# Patient Record
Sex: MALE | Race: WHITE | Employment: OTHER | URBAN - METROPOLITAN AREA
[De-identification: names, ages, dates, MRNs, and addresses within clinical notes are randomized per-mention and may not be internally consistent; named-entity substitution may affect disease eponyms.]

---

## 2017-01-01 ENCOUNTER — ANESTHESIA (OUTPATIENT)
Dept: SURGERY | Age: 77
End: 2017-01-01
Payer: MEDICARE

## 2017-01-01 ENCOUNTER — HOSPICE ADMISSION (OUTPATIENT)
Dept: HOSPICE | Facility: HOSPICE | Age: 77
End: 2017-01-01
Payer: MEDICARE

## 2017-01-01 ENCOUNTER — HOSPITAL ENCOUNTER (INPATIENT)
Age: 77
LOS: 4 days | End: 2017-11-19
Attending: INTERNAL MEDICINE | Admitting: INTERNAL MEDICINE

## 2017-01-01 ENCOUNTER — HOSPITAL ENCOUNTER (OUTPATIENT)
Dept: SURGERY | Age: 77
Discharge: HOME | End: 2017-02-06
Payer: MEDICARE

## 2017-01-01 ENCOUNTER — SURGERY (OUTPATIENT)
Age: 77
End: 2017-01-01

## 2017-01-01 ENCOUNTER — PATIENT OUTREACH (OUTPATIENT)
Dept: CASE MANAGEMENT | Age: 77
End: 2017-01-01

## 2017-01-01 ENCOUNTER — HOSPITAL ENCOUNTER (OUTPATIENT)
Age: 77
Setting detail: OBSERVATION
Discharge: HOME | End: 2017-02-15
Attending: UROLOGY | Admitting: UROLOGY
Payer: MEDICARE

## 2017-01-01 ENCOUNTER — ANESTHESIA EVENT (OUTPATIENT)
Dept: SURGERY | Age: 77
End: 2017-01-01
Payer: MEDICARE

## 2017-01-01 VITALS
HEART RATE: 83 BPM | TEMPERATURE: 97.8 F | HEIGHT: 66 IN | SYSTOLIC BLOOD PRESSURE: 83 MMHG | BODY MASS INDEX: 19.61 KG/M2 | DIASTOLIC BLOOD PRESSURE: 51 MMHG | RESPIRATION RATE: 17 BRPM | WEIGHT: 122 LBS | OXYGEN SATURATION: 96 %

## 2017-01-01 VITALS
HEART RATE: 78 BPM | TEMPERATURE: 97.7 F | WEIGHT: 125.31 LBS | HEIGHT: 66 IN | OXYGEN SATURATION: 95 % | SYSTOLIC BLOOD PRESSURE: 111 MMHG | BODY MASS INDEX: 20.14 KG/M2 | RESPIRATION RATE: 20 BRPM | DIASTOLIC BLOOD PRESSURE: 59 MMHG

## 2017-01-01 VITALS
SYSTOLIC BLOOD PRESSURE: 100 MMHG | RESPIRATION RATE: 15 BRPM | DIASTOLIC BLOOD PRESSURE: 58 MMHG | HEART RATE: 87 BPM | TEMPERATURE: 97.1 F

## 2017-01-01 DIAGNOSIS — R65.10 SYSTEMIC INFLAMMATORY RESPONSE SYNDROME (HCC): ICD-10-CM

## 2017-01-01 DIAGNOSIS — C83.00 SMALL B-CELL LYMPHOMA, UNSPECIFIED BODY REGION (HCC): ICD-10-CM

## 2017-01-01 DIAGNOSIS — C91.10 CLL (CHRONIC LYMPHOCYTIC LEUKEMIA) (HCC): Chronic | ICD-10-CM

## 2017-01-01 LAB
ABO + RH BLD: NORMAL
ANION GAP BLD CALC-SCNC: 8 MMOL/L (ref 7–16)
APPEARANCE UR: ABNORMAL
BACTERIA URNS QL MICRO: ABNORMAL /HPF
BILIRUB UR QL: NEGATIVE
BLOOD GROUP ANTIBODIES SERPL: NORMAL
BUN SERPL-MCNC: 20 MG/DL (ref 8–23)
CALCIUM SERPL-MCNC: 8.1 MG/DL (ref 8.3–10.4)
CASTS URNS QL MICRO: 0 /LPF
CHLORIDE SERPL-SCNC: 103 MMOL/L (ref 98–107)
CO2 SERPL-SCNC: 29 MMOL/L (ref 21–32)
COLOR UR: YELLOW
CREAT SERPL-MCNC: 1.24 MG/DL (ref 0.8–1.5)
CRYSTALS URNS QL MICRO: 0 /LPF
EPI CELLS #/AREA URNS HPF: ABNORMAL /HPF
ERYTHROCYTE [DISTWIDTH] IN BLOOD BY AUTOMATED COUNT: 14.6 % (ref 11.9–14.6)
GLUCOSE SERPL-MCNC: 110 MG/DL (ref 65–100)
GLUCOSE UR STRIP.AUTO-MCNC: NEGATIVE MG/DL
HCT VFR BLD AUTO: 37.3 % (ref 41.1–50.3)
HGB BLD-MCNC: 11.5 G/DL (ref 13.6–17.2)
HGB UR QL STRIP: ABNORMAL
KETONES UR QL STRIP.AUTO: NEGATIVE MG/DL
LEUKOCYTE ESTERASE UR QL STRIP.AUTO: ABNORMAL
MCH RBC QN AUTO: 27.6 PG (ref 26.1–32.9)
MCHC RBC AUTO-ENTMCNC: 30.8 G/DL (ref 31.4–35)
MCV RBC AUTO: 89.4 FL (ref 79.6–97.8)
MUCOUS THREADS URNS QL MICRO: 0 /LPF
NITRITE UR QL STRIP.AUTO: POSITIVE
OTHER OBSERVATIONS,UCOM: ABNORMAL
PH UR STRIP: 6 [PH] (ref 5–9)
PLATELET # BLD AUTO: 111 K/UL (ref 150–450)
PMV BLD AUTO: 9.7 FL (ref 10.8–14.1)
POTASSIUM SERPL-SCNC: 4.9 MMOL/L (ref 3.5–5.1)
PROT UR STRIP-MCNC: NEGATIVE MG/DL
RBC # BLD AUTO: 4.17 M/UL (ref 4.23–5.67)
RBC #/AREA URNS HPF: ABNORMAL /HPF
SODIUM SERPL-SCNC: 140 MMOL/L (ref 136–145)
SP GR UR REFRACTOMETRY: 1.01 (ref 1–1.02)
SPECIMEN EXP DATE BLD: NORMAL
UROBILINOGEN UR QL STRIP.AUTO: 0.2 EU/DL (ref 0.2–1)
WBC # BLD AUTO: 18 K/UL (ref 4.3–11.1)
WBC URNS QL MICRO: ABNORMAL /HPF

## 2017-01-01 PROCEDURE — 74011000258 HC RX REV CODE- 258: Performed by: UROLOGY

## 2017-01-01 PROCEDURE — 76010000149 HC OR TIME 1 TO 1.5 HR: Performed by: UROLOGY

## 2017-01-01 PROCEDURE — 74011250636 HC RX REV CODE- 250/636: Performed by: INTERNAL MEDICINE

## 2017-01-01 PROCEDURE — 74011250637 HC RX REV CODE- 250/637: Performed by: INTERNAL MEDICINE

## 2017-01-01 PROCEDURE — 77030018836 HC SOL IRR NACL ICUM -A

## 2017-01-01 PROCEDURE — 99231 SBSQ HOSP IP/OBS SF/LOW 25: CPT | Performed by: INTERNAL MEDICINE

## 2017-01-01 PROCEDURE — 94760 N-INVAS EAR/PLS OXIMETRY 1: CPT

## 2017-01-01 PROCEDURE — 74011000250 HC RX REV CODE- 250

## 2017-01-01 PROCEDURE — 97161 PT EVAL LOW COMPLEX 20 MIN: CPT

## 2017-01-01 PROCEDURE — 74011000250 HC RX REV CODE- 250: Performed by: NURSE PRACTITIONER

## 2017-01-01 PROCEDURE — 74011250637 HC RX REV CODE- 250/637: Performed by: UROLOGY

## 2017-01-01 PROCEDURE — 99218 HC RM OBSERVATION: CPT

## 2017-01-01 PROCEDURE — 81015 MICROSCOPIC EXAM OF URINE: CPT | Performed by: UROLOGY

## 2017-01-01 PROCEDURE — 77030032490 HC SLV COMPR SCD KNE COVD -B: Performed by: UROLOGY

## 2017-01-01 PROCEDURE — 77030020782 HC GWN BAIR PAWS FLX 3M -B: Performed by: NURSE ANESTHETIST, CERTIFIED REGISTERED

## 2017-01-01 PROCEDURE — 0656 HSPC GENERAL INPATIENT

## 2017-01-01 PROCEDURE — 77030010545: Performed by: UROLOGY

## 2017-01-01 PROCEDURE — 74011250636 HC RX REV CODE- 250/636: Performed by: NURSE PRACTITIONER

## 2017-01-01 PROCEDURE — 94640 AIRWAY INHALATION TREATMENT: CPT

## 2017-01-01 PROCEDURE — 77030019927 HC TBNG IRR CYSTO BAXT -A: Performed by: UROLOGY

## 2017-01-01 PROCEDURE — 77030005206: Performed by: UROLOGY

## 2017-01-01 PROCEDURE — 74011000250 HC RX REV CODE- 250: Performed by: INTERNAL MEDICINE

## 2017-01-01 PROCEDURE — G8980 MOBILITY D/C STATUS: HCPCS

## 2017-01-01 PROCEDURE — 74011000250 HC RX REV CODE- 250: Performed by: UROLOGY

## 2017-01-01 PROCEDURE — 77030018830 HC SOL IRR GLYC ICUM-A: Performed by: UROLOGY

## 2017-01-01 PROCEDURE — 74011250636 HC RX REV CODE- 250/636

## 2017-01-01 PROCEDURE — 74011250636 HC RX REV CODE- 250/636: Performed by: UROLOGY

## 2017-01-01 PROCEDURE — 86900 BLOOD TYPING SEROLOGIC ABO: CPT | Performed by: ANESTHESIOLOGY

## 2017-01-01 PROCEDURE — 77010033678 HC OXYGEN DAILY

## 2017-01-01 PROCEDURE — 88305 TISSUE EXAM BY PATHOLOGIST: CPT | Performed by: UROLOGY

## 2017-01-01 PROCEDURE — G8979 MOBILITY GOAL STATUS: HCPCS

## 2017-01-01 PROCEDURE — 99222 1ST HOSP IP/OBS MODERATE 55: CPT | Performed by: INTERNAL MEDICINE

## 2017-01-01 PROCEDURE — 74011250636 HC RX REV CODE- 250/636: Performed by: ANESTHESIOLOGY

## 2017-01-01 PROCEDURE — 85027 COMPLETE CBC AUTOMATED: CPT | Performed by: UROLOGY

## 2017-01-01 PROCEDURE — 36415 COLL VENOUS BLD VENIPUNCTURE: CPT | Performed by: UROLOGY

## 2017-01-01 PROCEDURE — 82355 CALCULUS ANALYSIS QUAL: CPT | Performed by: UROLOGY

## 2017-01-01 PROCEDURE — 74011250636 HC RX REV CODE- 250/636: Performed by: HOSPITALIST

## 2017-01-01 PROCEDURE — 77030018846 HC SOL IRR STRL H20 ICUM -A: Performed by: UROLOGY

## 2017-01-01 PROCEDURE — 77030007880 HC KT SPN EPDRL BBMI -B: Performed by: NURSE ANESTHETIST, CERTIFIED REGISTERED

## 2017-01-01 PROCEDURE — 3336500001 HSPC ELECTION

## 2017-01-01 PROCEDURE — 76060000033 HC ANESTHESIA 1 TO 1.5 HR: Performed by: UROLOGY

## 2017-01-01 PROCEDURE — 81001 URINALYSIS AUTO W/SCOPE: CPT | Performed by: UROLOGY

## 2017-01-01 PROCEDURE — 77030011640 HC PAD GRND REM COVD -A: Performed by: UROLOGY

## 2017-01-01 PROCEDURE — G8978 MOBILITY CURRENT STATUS: HCPCS

## 2017-01-01 PROCEDURE — 80048 BASIC METABOLIC PNL TOTAL CA: CPT | Performed by: UROLOGY

## 2017-01-01 PROCEDURE — 76210000006 HC OR PH I REC 0.5 TO 1 HR: Performed by: UROLOGY

## 2017-01-01 PROCEDURE — 77030003665 HC NDL SPN BBMI -A: Performed by: NURSE ANESTHETIST, CERTIFIED REGISTERED

## 2017-01-01 PROCEDURE — 77030005546 HC CATH URETH FOL 3W BARD -A: Performed by: UROLOGY

## 2017-01-01 RX ORDER — PANTOPRAZOLE SODIUM 40 MG/1
40 TABLET, DELAYED RELEASE ORAL
Status: DISCONTINUED | OUTPATIENT
Start: 2017-01-01 | End: 2017-01-01 | Stop reason: HOSPADM

## 2017-01-01 RX ORDER — SODIUM CHLORIDE 0.9 % (FLUSH) 0.9 %
10 SYRINGE (ML) INJECTION AS NEEDED
Status: DISCONTINUED | OUTPATIENT
Start: 2017-01-01 | End: 2017-01-01 | Stop reason: HOSPADM

## 2017-01-01 RX ORDER — SODIUM CHLORIDE, SODIUM LACTATE, POTASSIUM CHLORIDE, CALCIUM CHLORIDE 600; 310; 30; 20 MG/100ML; MG/100ML; MG/100ML; MG/100ML
75 INJECTION, SOLUTION INTRAVENOUS CONTINUOUS
Status: DISCONTINUED | OUTPATIENT
Start: 2017-01-01 | End: 2017-01-01

## 2017-01-01 RX ORDER — FACIAL-BODY WIPES
10 EACH TOPICAL AS NEEDED
Status: DISCONTINUED | OUTPATIENT
Start: 2017-01-01 | End: 2017-01-01 | Stop reason: HOSPADM

## 2017-01-01 RX ORDER — OXYCODONE HYDROCHLORIDE 5 MG/1
10 TABLET ORAL
Status: DISCONTINUED | OUTPATIENT
Start: 2017-01-01 | End: 2017-01-01

## 2017-01-01 RX ORDER — NITROGLYCERIN 0.4 MG/1
0.4 TABLET SUBLINGUAL
Status: DISCONTINUED | OUTPATIENT
Start: 2017-01-01 | End: 2017-01-01 | Stop reason: HOSPADM

## 2017-01-01 RX ORDER — NALOXONE HYDROCHLORIDE 0.4 MG/ML
0.4 INJECTION, SOLUTION INTRAMUSCULAR; INTRAVENOUS; SUBCUTANEOUS AS NEEDED
Status: DISCONTINUED | OUTPATIENT
Start: 2017-01-01 | End: 2017-01-01 | Stop reason: HOSPADM

## 2017-01-01 RX ORDER — GLYCOPYRROLATE 0.2 MG/ML
0.2 INJECTION INTRAMUSCULAR; INTRAVENOUS
Status: DISCONTINUED | OUTPATIENT
Start: 2017-01-01 | End: 2017-01-01

## 2017-01-01 RX ORDER — MORPHINE SULFATE 4 MG/ML
4 INJECTION, SOLUTION INTRAMUSCULAR; INTRAVENOUS
Status: DISCONTINUED | OUTPATIENT
Start: 2017-01-01 | End: 2017-01-01 | Stop reason: HOSPADM

## 2017-01-01 RX ORDER — LORAZEPAM 2 MG/ML
2 INJECTION INTRAMUSCULAR
Status: DISCONTINUED | OUTPATIENT
Start: 2017-01-01 | End: 2017-01-01 | Stop reason: HOSPADM

## 2017-01-01 RX ORDER — OXYBUTYNIN CHLORIDE 5 MG/1
5 TABLET ORAL
Status: DISCONTINUED | OUTPATIENT
Start: 2017-01-01 | End: 2017-01-01 | Stop reason: HOSPADM

## 2017-01-01 RX ORDER — LORATADINE 10 MG/1
10 TABLET ORAL DAILY
Status: DISCONTINUED | OUTPATIENT
Start: 2017-01-01 | End: 2017-01-01 | Stop reason: HOSPADM

## 2017-01-01 RX ORDER — GLYCOPYRROLATE 0.2 MG/ML
0.2 INJECTION INTRAMUSCULAR; INTRAVENOUS
Status: DISCONTINUED | OUTPATIENT
Start: 2017-01-01 | End: 2017-01-01 | Stop reason: HOSPADM

## 2017-01-01 RX ORDER — ACETAMINOPHEN 325 MG/1
650 TABLET ORAL
Status: DISCONTINUED | OUTPATIENT
Start: 2017-01-01 | End: 2017-01-01

## 2017-01-01 RX ORDER — HALOPERIDOL 5 MG/ML
2 INJECTION INTRAMUSCULAR
Status: DISCONTINUED | OUTPATIENT
Start: 2017-01-01 | End: 2017-01-01 | Stop reason: HOSPADM

## 2017-01-01 RX ORDER — PREDNISONE 10 MG/1
10 TABLET ORAL SEE ADMIN INSTRUCTIONS
Status: DISCONTINUED | OUTPATIENT
Start: 2017-01-01 | End: 2017-01-01

## 2017-01-01 RX ORDER — ALBUTEROL SULFATE 90 UG/1
2 AEROSOL, METERED RESPIRATORY (INHALATION)
Status: DISCONTINUED | OUTPATIENT
Start: 2017-01-01 | End: 2017-01-01 | Stop reason: HOSPADM

## 2017-01-01 RX ORDER — PROPOFOL 10 MG/ML
INJECTION, EMULSION INTRAVENOUS
Status: DISCONTINUED | OUTPATIENT
Start: 2017-01-01 | End: 2017-01-01 | Stop reason: HOSPADM

## 2017-01-01 RX ORDER — SULFAMETHOXAZOLE AND TRIMETHOPRIM 800; 160 MG/1; MG/1
1 TABLET ORAL 2 TIMES DAILY
Qty: 14 TAB | Refills: 0 | Status: SHIPPED | OUTPATIENT
Start: 2017-01-01 | End: 2017-01-01

## 2017-01-01 RX ORDER — SENNOSIDES 8.6 MG/1
1 TABLET ORAL 2 TIMES DAILY
Status: DISCONTINUED | OUTPATIENT
Start: 2017-01-01 | End: 2017-01-01

## 2017-01-01 RX ORDER — ACETAMINOPHEN 650 MG/1
650 SUPPOSITORY RECTAL
Status: DISCONTINUED | OUTPATIENT
Start: 2017-01-01 | End: 2017-01-01 | Stop reason: HOSPADM

## 2017-01-01 RX ORDER — CEFAZOLIN SODIUM IN 0.9 % NACL 2 G/50 ML
2 INTRAVENOUS SOLUTION, PIGGYBACK (ML) INTRAVENOUS ONCE
Status: COMPLETED | OUTPATIENT
Start: 2017-01-01 | End: 2017-01-01

## 2017-01-01 RX ORDER — SODIUM CHLORIDE 0.9 % (FLUSH) 0.9 %
5-10 SYRINGE (ML) INJECTION AS NEEDED
Status: DISCONTINUED | OUTPATIENT
Start: 2017-01-01 | End: 2017-01-01 | Stop reason: HOSPADM

## 2017-01-01 RX ORDER — DIPHENHYDRAMINE HYDROCHLORIDE 50 MG/ML
12.5 INJECTION, SOLUTION INTRAMUSCULAR; INTRAVENOUS
Status: DISCONTINUED | OUTPATIENT
Start: 2017-01-01 | End: 2017-01-01

## 2017-01-01 RX ORDER — HYDROMORPHONE HYDROCHLORIDE 1 MG/ML
1 INJECTION, SOLUTION INTRAMUSCULAR; INTRAVENOUS; SUBCUTANEOUS
Status: DISCONTINUED | OUTPATIENT
Start: 2017-01-01 | End: 2017-01-01 | Stop reason: HOSPADM

## 2017-01-01 RX ORDER — HEPARIN 100 UNIT/ML
300 SYRINGE INTRAVENOUS EVERY 12 HOURS
Status: DISCONTINUED | OUTPATIENT
Start: 2017-01-01 | End: 2017-01-01 | Stop reason: HOSPADM

## 2017-01-01 RX ORDER — OXYCODONE HYDROCHLORIDE 5 MG/1
5 TABLET ORAL
Status: DISCONTINUED | OUTPATIENT
Start: 2017-01-01 | End: 2017-01-01

## 2017-01-01 RX ORDER — LIDOCAINE HYDROCHLORIDE 10 MG/ML
0.1 INJECTION INFILTRATION; PERINEURAL AS NEEDED
Status: DISCONTINUED | OUTPATIENT
Start: 2017-01-01 | End: 2017-01-01 | Stop reason: HOSPADM

## 2017-01-01 RX ORDER — POTASSIUM CHLORIDE 750 MG/1
20 TABLET, EXTENDED RELEASE ORAL 2 TIMES DAILY
Status: DISCONTINUED | OUTPATIENT
Start: 2017-01-01 | End: 2017-01-01 | Stop reason: HOSPADM

## 2017-01-01 RX ORDER — HEPARIN 100 UNIT/ML
300 SYRINGE INTRAVENOUS AS NEEDED
Status: DISCONTINUED | OUTPATIENT
Start: 2017-01-01 | End: 2017-01-01

## 2017-01-01 RX ORDER — HYDROMORPHONE HYDROCHLORIDE 2 MG/ML
0.5 INJECTION, SOLUTION INTRAMUSCULAR; INTRAVENOUS; SUBCUTANEOUS
Status: DISCONTINUED | OUTPATIENT
Start: 2017-01-01 | End: 2017-01-01

## 2017-01-01 RX ORDER — DEXTROSE 40 %
GEL (GRAM) ORAL
Status: DISCONTINUED
Start: 2017-01-01 | End: 2017-01-01

## 2017-01-01 RX ORDER — SODIUM CHLORIDE 0.9 % (FLUSH) 0.9 %
10 SYRINGE (ML) INJECTION EVERY 12 HOURS
Status: DISCONTINUED | OUTPATIENT
Start: 2017-01-01 | End: 2017-01-01 | Stop reason: HOSPADM

## 2017-01-01 RX ORDER — HYOSCYAMINE SULFATE 0.12 MG/1
0.12 TABLET SUBLINGUAL
Status: DISCONTINUED | OUTPATIENT
Start: 2017-01-01 | End: 2017-01-01

## 2017-01-01 RX ORDER — LEVOTHYROXINE SODIUM 100 UG/1
100 TABLET ORAL
Status: DISCONTINUED | OUTPATIENT
Start: 2017-01-01 | End: 2017-01-01 | Stop reason: HOSPADM

## 2017-01-01 RX ORDER — FLUTICASONE PROPIONATE 110 UG/1
2 AEROSOL, METERED RESPIRATORY (INHALATION) EVERY 12 HOURS
COMMUNITY
End: 2017-01-01

## 2017-01-01 RX ORDER — SODIUM CHLORIDE 0.9 % (FLUSH) 0.9 %
5-10 SYRINGE (ML) INJECTION EVERY 8 HOURS
Status: DISCONTINUED | OUTPATIENT
Start: 2017-01-01 | End: 2017-01-01 | Stop reason: HOSPADM

## 2017-01-01 RX ORDER — TENOFOVIR DISOPROXIL FUMARATE 300 MG/1
300 TABLET, FILM COATED ORAL
Status: DISCONTINUED | OUTPATIENT
Start: 2017-01-01 | End: 2017-01-01 | Stop reason: HOSPADM

## 2017-01-01 RX ORDER — ALBUTEROL SULFATE 90 UG/1
2 AEROSOL, METERED RESPIRATORY (INHALATION)
COMMUNITY
End: 2017-01-01

## 2017-01-01 RX ORDER — HEPARIN 100 UNIT/ML
300 SYRINGE INTRAVENOUS AS NEEDED
Status: DISCONTINUED | OUTPATIENT
Start: 2017-01-01 | End: 2017-01-01 | Stop reason: HOSPADM

## 2017-01-01 RX ORDER — BUPIVACAINE HYDROCHLORIDE 7.5 MG/ML
INJECTION, SOLUTION INTRASPINAL AS NEEDED
Status: DISCONTINUED | OUTPATIENT
Start: 2017-01-01 | End: 2017-01-01 | Stop reason: HOSPADM

## 2017-01-01 RX ORDER — FENTANYL CITRATE 50 UG/ML
INJECTION, SOLUTION INTRAMUSCULAR; INTRAVENOUS AS NEEDED
Status: DISCONTINUED | OUTPATIENT
Start: 2017-01-01 | End: 2017-01-01 | Stop reason: HOSPADM

## 2017-01-01 RX ORDER — LORAZEPAM 2 MG/ML
1 INJECTION INTRAMUSCULAR
Status: DISCONTINUED | OUTPATIENT
Start: 2017-01-01 | End: 2017-01-01 | Stop reason: HOSPADM

## 2017-01-01 RX ORDER — FLUTICASONE PROPIONATE 50 MCG
2 SPRAY, SUSPENSION (ML) NASAL DAILY
Status: DISCONTINUED | OUTPATIENT
Start: 2017-01-01 | End: 2017-01-01 | Stop reason: HOSPADM

## 2017-01-01 RX ORDER — ONDANSETRON 2 MG/ML
4 INJECTION INTRAMUSCULAR; INTRAVENOUS
Status: DISCONTINUED | OUTPATIENT
Start: 2017-01-01 | End: 2017-01-01 | Stop reason: HOSPADM

## 2017-01-01 RX ORDER — DEXTROSE MONOHYDRATE AND SODIUM CHLORIDE 5; .45 G/100ML; G/100ML
75 INJECTION, SOLUTION INTRAVENOUS CONTINUOUS
Status: DISCONTINUED | OUTPATIENT
Start: 2017-01-01 | End: 2017-01-01

## 2017-01-01 RX ORDER — NALOXONE HYDROCHLORIDE 0.4 MG/ML
0.1 INJECTION, SOLUTION INTRAMUSCULAR; INTRAVENOUS; SUBCUTANEOUS
Status: DISCONTINUED | OUTPATIENT
Start: 2017-01-01 | End: 2017-01-01

## 2017-01-01 RX ORDER — MORPHINE SULFATE 100 MG/5ML
10 SOLUTION ORAL
Status: DISCONTINUED | OUTPATIENT
Start: 2017-01-01 | End: 2017-01-01 | Stop reason: HOSPADM

## 2017-01-01 RX ORDER — SODIUM CHLORIDE 0.9 % (FLUSH) 0.9 %
3 SYRINGE (ML) INJECTION AS NEEDED
Status: DISCONTINUED | OUTPATIENT
Start: 2017-01-01 | End: 2017-01-01

## 2017-01-01 RX ORDER — SODIUM CHLORIDE, SODIUM LACTATE, POTASSIUM CHLORIDE, CALCIUM CHLORIDE 600; 310; 30; 20 MG/100ML; MG/100ML; MG/100ML; MG/100ML
75 INJECTION, SOLUTION INTRAVENOUS CONTINUOUS
Status: DISCONTINUED | OUTPATIENT
Start: 2017-01-01 | End: 2017-01-01 | Stop reason: HOSPADM

## 2017-01-01 RX ORDER — LOPERAMIDE HYDROCHLORIDE 2 MG/1
4 CAPSULE ORAL AS NEEDED
Status: DISCONTINUED | OUTPATIENT
Start: 2017-01-01 | End: 2017-01-01

## 2017-01-01 RX ORDER — SODIUM CHLORIDE 0.9 % (FLUSH) 0.9 %
5-10 SYRINGE (ML) INJECTION AS NEEDED
Status: DISCONTINUED | OUTPATIENT
Start: 2017-01-01 | End: 2017-01-01

## 2017-01-01 RX ORDER — SODIUM CHLORIDE 0.9 % (FLUSH) 0.9 %
5-10 SYRINGE (ML) INJECTION EVERY 8 HOURS
Status: DISCONTINUED | OUTPATIENT
Start: 2017-01-01 | End: 2017-01-01

## 2017-01-01 RX ORDER — HYDROCODONE BITARTRATE AND ACETAMINOPHEN 5; 325 MG/1; MG/1
1-2 TABLET ORAL
Qty: 25 TAB | Refills: 0 | Status: SHIPPED | OUTPATIENT
Start: 2017-01-01 | End: 2017-01-01 | Stop reason: ALTCHOICE

## 2017-01-01 RX ORDER — FLUMAZENIL 0.1 MG/ML
0.2 INJECTION INTRAVENOUS AS NEEDED
Status: DISCONTINUED | OUTPATIENT
Start: 2017-01-01 | End: 2017-01-01

## 2017-01-01 RX ORDER — BUDESONIDE 0.5 MG/2ML
2 INHALANT ORAL EVERY 12 HOURS
Status: DISCONTINUED | OUTPATIENT
Start: 2017-01-01 | End: 2017-01-01 | Stop reason: HOSPADM

## 2017-01-01 RX ORDER — HYDROCODONE BITARTRATE AND ACETAMINOPHEN 5; 325 MG/1; MG/1
1 TABLET ORAL
Status: DISCONTINUED | OUTPATIENT
Start: 2017-01-01 | End: 2017-01-01 | Stop reason: HOSPADM

## 2017-01-01 RX ORDER — ACETAMINOPHEN 325 MG/1
650 TABLET ORAL
Status: DISCONTINUED | OUTPATIENT
Start: 2017-01-01 | End: 2017-01-01 | Stop reason: HOSPADM

## 2017-01-01 RX ORDER — ESCITALOPRAM OXALATE 10 MG/1
10 TABLET ORAL DAILY
Status: DISCONTINUED | OUTPATIENT
Start: 2017-01-01 | End: 2017-01-01 | Stop reason: HOSPADM

## 2017-01-01 RX ORDER — PREDNISONE 10 MG/1
10 TABLET ORAL SEE ADMIN INSTRUCTIONS
COMMUNITY
End: 2017-01-01

## 2017-01-01 RX ADMIN — LORAZEPAM 1 MG: 2 INJECTION INTRAMUSCULAR; INTRAVENOUS at 02:33

## 2017-01-01 RX ADMIN — Medication 10 ML: at 22:00

## 2017-01-01 RX ADMIN — FENTANYL CITRATE 25 MCG: 50 INJECTION, SOLUTION INTRAMUSCULAR; INTRAVENOUS at 16:49

## 2017-01-01 RX ADMIN — MORPHINE SULFATE 10 MG: 100 SOLUTION ORAL at 12:40

## 2017-01-01 RX ADMIN — MORPHINE SULFATE 4 MG: 4 INJECTION, SOLUTION INTRAMUSCULAR; INTRAVENOUS at 10:50

## 2017-01-01 RX ADMIN — ESCITALOPRAM OXALATE 10 MG: 10 TABLET ORAL at 09:14

## 2017-01-01 RX ADMIN — LORAZEPAM 2 MG: 2 INJECTION, SOLUTION INTRAMUSCULAR; INTRAVENOUS at 01:06

## 2017-01-01 RX ADMIN — Medication 10 ML: at 06:00

## 2017-01-01 RX ADMIN — FENTANYL CITRATE 25 MCG: 50 INJECTION, SOLUTION INTRAMUSCULAR; INTRAVENOUS at 16:42

## 2017-01-01 RX ADMIN — POTASSIUM CHLORIDE 20 MEQ: 10 TABLET, EXTENDED RELEASE ORAL at 09:13

## 2017-01-01 RX ADMIN — LORAZEPAM 2 MG: 2 INJECTION, SOLUTION INTRAMUSCULAR; INTRAVENOUS at 03:24

## 2017-01-01 RX ADMIN — MORPHINE SULFATE 4 MG: 4 INJECTION, SOLUTION INTRAMUSCULAR; INTRAVENOUS at 14:03

## 2017-01-01 RX ADMIN — SODIUM CHLORIDE, PRESERVATIVE FREE 300 UNITS: 5 INJECTION INTRAVENOUS at 08:58

## 2017-01-01 RX ADMIN — SODIUM CHLORIDE, PRESERVATIVE FREE 10 ML: 5 INJECTION INTRAVENOUS at 05:40

## 2017-01-01 RX ADMIN — ESCITALOPRAM OXALATE 10 MG: 10 TABLET ORAL at 09:02

## 2017-01-01 RX ADMIN — SODIUM CHLORIDE, PRESERVATIVE FREE 10 ML: 5 INJECTION INTRAVENOUS at 14:50

## 2017-01-01 RX ADMIN — FENTANYL CITRATE 25 MCG: 50 INJECTION, SOLUTION INTRAMUSCULAR; INTRAVENOUS at 16:55

## 2017-01-01 RX ADMIN — LORAZEPAM 2 MG: 2 INJECTION, SOLUTION INTRAMUSCULAR; INTRAVENOUS at 05:57

## 2017-01-01 RX ADMIN — POTASSIUM CHLORIDE 20 MEQ: 10 TABLET, EXTENDED RELEASE ORAL at 22:46

## 2017-01-01 RX ADMIN — SODIUM CHLORIDE, PRESERVATIVE FREE 10 ML: 5 INJECTION INTRAVENOUS at 13:49

## 2017-01-01 RX ADMIN — SODIUM CHLORIDE, PRESERVATIVE FREE 10 ML: 5 INJECTION INTRAVENOUS at 12:42

## 2017-01-01 RX ADMIN — ONDANSETRON 4 MG: 2 INJECTION INTRAMUSCULAR; INTRAVENOUS at 22:46

## 2017-01-01 RX ADMIN — SODIUM CHLORIDE, PRESERVATIVE FREE 300 UNITS: 5 INJECTION INTRAVENOUS at 20:00

## 2017-01-01 RX ADMIN — SODIUM CHLORIDE, PRESERVATIVE FREE 300 UNITS: 5 INJECTION INTRAVENOUS at 12:41

## 2017-01-01 RX ADMIN — BUDESONIDE 500 MCG: 0.5 INHALANT RESPIRATORY (INHALATION) at 09:25

## 2017-01-01 RX ADMIN — SODIUM CHLORIDE, SODIUM LACTATE, POTASSIUM CHLORIDE, AND CALCIUM CHLORIDE 75 ML/HR: 600; 310; 30; 20 INJECTION, SOLUTION INTRAVENOUS at 13:28

## 2017-01-01 RX ADMIN — PROPOFOL 100 MCG/KG/MIN: 10 INJECTION, EMULSION INTRAVENOUS at 16:21

## 2017-01-01 RX ADMIN — BUDESONIDE 500 MCG: 0.5 INHALANT RESPIRATORY (INHALATION) at 20:35

## 2017-01-01 RX ADMIN — SODIUM CHLORIDE, PRESERVATIVE FREE 10 ML: 5 INJECTION INTRAVENOUS at 20:02

## 2017-01-01 RX ADMIN — MORPHINE SULFATE 4 MG: 4 INJECTION, SOLUTION INTRAMUSCULAR; INTRAVENOUS at 19:58

## 2017-01-01 RX ADMIN — CEFAZOLIN SODIUM 1 G: 1 INJECTION, POWDER, FOR SOLUTION INTRAMUSCULAR; INTRAVENOUS at 22:46

## 2017-01-01 RX ADMIN — PANTOPRAZOLE SODIUM 40 MG: 40 TABLET, DELAYED RELEASE ORAL at 06:33

## 2017-01-01 RX ADMIN — LEVOTHYROXINE SODIUM 100 MCG: 100 TABLET ORAL at 06:33

## 2017-01-01 RX ADMIN — HALOPERIDOL LACTATE 2 MG: 5 INJECTION INTRAMUSCULAR at 08:44

## 2017-01-01 RX ADMIN — SENNOSIDES 8.6 MG: 8.6 TABLET, FILM COATED ORAL at 08:22

## 2017-01-01 RX ADMIN — SODIUM CHLORIDE, PRESERVATIVE FREE 10 ML: 5 INJECTION INTRAVENOUS at 20:00

## 2017-01-01 RX ADMIN — POTASSIUM CHLORIDE 20 MEQ: 10 TABLET, EXTENDED RELEASE ORAL at 09:02

## 2017-01-01 RX ADMIN — HYDROMORPHONE HYDROCHLORIDE 1 MG: 1 INJECTION, SOLUTION INTRAMUSCULAR; INTRAVENOUS; SUBCUTANEOUS at 23:08

## 2017-01-01 RX ADMIN — Medication 10 ML: at 14:00

## 2017-01-01 RX ADMIN — SODIUM CHLORIDE, PRESERVATIVE FREE 10 ML: 5 INJECTION INTRAVENOUS at 08:57

## 2017-01-01 RX ADMIN — LORAZEPAM 2 MG: 2 INJECTION, SOLUTION INTRAMUSCULAR; INTRAVENOUS at 04:30

## 2017-01-01 RX ADMIN — SODIUM CHLORIDE, PRESERVATIVE FREE 3 ML: 5 INJECTION INTRAVENOUS at 08:46

## 2017-01-01 RX ADMIN — LEVOTHYROXINE SODIUM 100 MCG: 100 TABLET ORAL at 06:38

## 2017-01-01 RX ADMIN — MORPHINE SULFATE 4 MG: 4 INJECTION, SOLUTION INTRAMUSCULAR; INTRAVENOUS at 08:54

## 2017-01-01 RX ADMIN — CEFAZOLIN SODIUM 1 G: 1 INJECTION, POWDER, FOR SOLUTION INTRAMUSCULAR; INTRAVENOUS at 06:37

## 2017-01-01 RX ADMIN — LORAZEPAM 2 MG: 2 INJECTION, SOLUTION INTRAMUSCULAR; INTRAVENOUS at 14:49

## 2017-01-01 RX ADMIN — LORATADINE 10 MG: 10 TABLET ORAL at 09:02

## 2017-01-01 RX ADMIN — POTASSIUM CHLORIDE 20 MEQ: 10 TABLET, EXTENDED RELEASE ORAL at 17:40

## 2017-01-01 RX ADMIN — MORPHINE SULFATE 4 MG: 4 INJECTION, SOLUTION INTRAMUSCULAR; INTRAVENOUS at 05:58

## 2017-01-01 RX ADMIN — BUDESONIDE 500 MCG: 0.5 INHALANT RESPIRATORY (INHALATION) at 07:32

## 2017-01-01 RX ADMIN — FENTANYL CITRATE 25 MCG: 50 INJECTION, SOLUTION INTRAMUSCULAR; INTRAVENOUS at 16:34

## 2017-01-01 RX ADMIN — SODIUM CHLORIDE, PRESERVATIVE FREE 300 UNITS: 5 INJECTION INTRAVENOUS at 13:50

## 2017-01-01 RX ADMIN — LORAZEPAM 2 MG: 2 INJECTION, SOLUTION INTRAMUSCULAR; INTRAVENOUS at 14:04

## 2017-01-01 RX ADMIN — LORAZEPAM 2 MG: 2 INJECTION, SOLUTION INTRAMUSCULAR; INTRAVENOUS at 10:51

## 2017-01-01 RX ADMIN — BUDESONIDE 500 MCG: 0.5 INHALANT RESPIRATORY (INHALATION) at 20:02

## 2017-01-01 RX ADMIN — SODIUM CHLORIDE, PRESERVATIVE FREE 300 UNITS: 5 INJECTION INTRAVENOUS at 20:02

## 2017-01-01 RX ADMIN — DEXTROSE MONOHYDRATE AND SODIUM CHLORIDE 75 ML/HR: 5; .45 INJECTION, SOLUTION INTRAVENOUS at 19:40

## 2017-01-01 RX ADMIN — LORAZEPAM 2 MG: 2 INJECTION, SOLUTION INTRAMUSCULAR; INTRAVENOUS at 19:59

## 2017-01-01 RX ADMIN — PANTOPRAZOLE SODIUM 40 MG: 40 TABLET, DELAYED RELEASE ORAL at 06:38

## 2017-01-01 RX ADMIN — SODIUM CHLORIDE, PRESERVATIVE FREE 10 ML: 5 INJECTION INTRAVENOUS at 21:49

## 2017-01-01 RX ADMIN — GLYCOPYRROLATE 0.2 MG: 0.2 INJECTION INTRAMUSCULAR; INTRAVENOUS at 08:53

## 2017-01-01 RX ADMIN — LORAZEPAM 2 MG: 2 INJECTION, SOLUTION INTRAMUSCULAR; INTRAVENOUS at 22:00

## 2017-01-01 RX ADMIN — CEFAZOLIN 2 G: 1 INJECTION, POWDER, FOR SOLUTION INTRAMUSCULAR; INTRAVENOUS; PARENTERAL at 16:12

## 2017-01-01 RX ADMIN — LORATADINE 10 MG: 10 TABLET ORAL at 09:14

## 2017-01-01 RX ADMIN — SODIUM CHLORIDE, SODIUM LACTATE, POTASSIUM CHLORIDE, AND CALCIUM CHLORIDE: 600; 310; 30; 20 INJECTION, SOLUTION INTRAVENOUS at 16:42

## 2017-01-01 RX ADMIN — BUPIVACAINE HYDROCHLORIDE 1.7 ML: 7.5 INJECTION, SOLUTION INTRASPINAL at 16:18

## 2017-01-20 PROBLEM — R33.8 BPH (BENIGN PROSTATIC HYPERTROPHY) WITH URINARY RETENTION: Status: ACTIVE | Noted: 2017-01-01

## 2017-01-20 PROBLEM — N40.1 BPH (BENIGN PROSTATIC HYPERTROPHY) WITH URINARY RETENTION: Status: ACTIVE | Noted: 2017-01-01

## 2017-02-06 NOTE — PERIOP NOTES
todays labs reviewed-- noted 4+ bact in urine-- also wbc 18-- called office--- left message for [facility]

## 2017-02-06 NOTE — PERIOP NOTES
Patient verified name, , and surgery as listed in [facility]. TYPE  CASE:2  Orders per surgeon: on chart  Labs per surgeon:cbc, bmp, urine-- these collected today and sent to lab:   Labs per anesthesia protocol: type and screen dos :   EKG  :  10/2016 from [facility] on chart, echo , stress -- pt had surg 2016 at [facility] using these same cardiac records      Patient provided with handouts including guide to surgery , transfusions, pain management and hand hygiene for the family and community. Pt verbalizes understanding of all pre-op instructions . Instructed that family must be present in building at all times. Hibiclens and instructions given per hospital policy. Instructed patient to continue  previous medications as prescribed prior to surgery and  to take the following medications the day of surgery according to anesthesia guidelines : lexapro, flonase,flovent,synthroid,omeprazole, tenoforvir, and bring albuterol inhaler       Original medication prescription bottles was not visualized during patient appointment. Continue all previous medications unless otherwise directed. Instructed patient to hold  the following medications prior to surgery: none      Patient verbalized understanding of all instructions and provided all medical/health information to the best of their ability.

## 2017-02-06 NOTE — PERIOP NOTES
Noted in [facility] where dr Lorenzo Carrion reviewed today urine at [id number] 19 today

## 2017-02-13 NOTE — IP AVS SNAPSHOT
Zari Petties 
 
 
 [address] [facility] 
[phone] Patient: Edita Devlin MRN: [id number] AMB:[id number] You are allergic to the following Allergen Reactions Iodinated Contrast Media - Oral And Iv Dye Nausea and Vomiting Other (comments) For 2 days and severe headache. Protonix (Pantoprazole) Diarrhea Red Dye Hives Recent Documentation Height Weight BMI Smoking Status 1.676 m 55.3 kg 19.69 kg/m2 Never Smoker Emergency Contacts Name Discharge Info Relation Home Work Mobile Dahlia Bellamy  Child [2] [phone] Zhao Bellamy  Child [2] [phone] About your hospitalization You were admitted on:  February 13, 2017 You last received care in the:  [facility] 6 MED SURG You were discharged on:  February 15, 2017 Unit phone number:  [phone] Why you were hospitalized Your primary diagnosis was:  Urinary Retention Your diagnoses also included:  Cll (Chronic Lymphocytic Leukemia) (Hcc), Bph (Benign Prostatic Hypertrophy) With Urinary Retention Providers Seen During Your Hospitalizations Provider Role Specialty Primary office phone Bina Meredith MD Attending Provider Urology [phone] Your Primary Care Physician (PCP) Primary Care Physician Office Phone Office Fax Al Grijalva [phone] Follow-up Information Follow up With Details Comments Contact Info Romel Ontiveros PA-C   [address] [place] [address] 
[phone] Baron Cushing, FNP On 3/2/2017 at 9:10 [address] [facility] [address] 
[phone] Your Appointments Thursday March 02, 2017  9:10 AM EST Office Visit with Baron Cushing, FNP [facility] Urology 50 (U [facility] UROLOGY) [address] [place] [address]  
[phone] Current Discharge Medication List  
  
START taking these medications Dose & Instructions Dispensing Information Comments Morning Noon Evening Bedtime HYDROcodone-acetaminophen 5-325 mg per tablet Commonly known as:  Raghu Grider Dose:  1-2 Tab Take 1-2 Tabs by mouth every four (4) hours as needed for Pain. Max Daily Amount: 12 Tabs. Quantity:  25 Tab Refills:  0  
     
   
   
   
  
 trimethoprim-sulfamethoxazole 160-800 mg per tablet Commonly known as:  BACTRIM DS Your next dose is: Today Dose:  1 Tab Take 1 Tab by mouth two (2) times a day for 7 days. Quantity:  14 Tab Refills:  0 CONTINUE these medications which have CHANGED Dose & Instructions Dispensing Information Comments Morning Noon Evening Bedtime  
 escitalopram oxalate 10 mg tablet Commonly known as:  Jimmy Champion What changed:  when to take this Your next dose is:  Tomorrow Dose:  10 mg Take 1 Tab by mouth daily. Quantity:  30 Tab Refills:  6 CONTINUE these medications which have NOT CHANGED Dose & Instructions Dispensing Information Comments Morning Noon Evening Bedtime  
 albuterol 90 mcg/actuation inhaler Commonly known as:  PROVENTIL HFA, VENTOLIN HFA, PROAIR HFA Dose:  2 Puff Take 2 Puffs by inhalation every four (4) hours as needed for Wheezing. Refills:  0  
     
   
   
   
  
 * FLOVENT  mcg/actuation inhaler Generic drug:  fluticasone Dose:  2 Puff Take 2 Puffs by inhalation every twelve (12) hours. Refills:  0  
     
   
   
   
  
 * fluticasone 50 mcg/actuation nasal spray Commonly known as:  Lella Solum Your next dose is: Today INSTILL 2 SPRAYS IN EACH NOSTRIL BID Refills:  2  
     
   
   
  
   
  
 levothyroxine 100 mcg tablet Commonly known as:  SYNTHROID Your next dose is:  Tomorrow Dose:  100 mcg Take 1 Tab by mouth Daily (before breakfast). Quantity:  90 Tab Refills:  1  
     
  
   
   
   
  
 magnesium oxide 400 mg tablet Commonly known as:  MAG-OX Your next dose is: Today Dose:  400 mg Take 400 mg by mouth two (2) times a day. Refills:  0  
     
   
   
  
   
  
 NITROSTAT 0.4 mg SL tablet Generic drug:  nitroglycerin Dose:  0.4 mg  
0.4 mg by SubLINGual route every five (5) minutes as needed. Refills:  0  
     
   
   
   
  
 omeprazole 40 mg capsule Commonly known as:  PRILOSEC Dose:  40 mg Take 40 mg by mouth every morning. Refills:  0  
     
  
   
   
   
  
 OTHER  
   
 every thirty (30) days. Indications: chemo and IVIG for CLL and low platelets Refills:  0 POTASSIUM CHLORIDE SR 10 MEQ TAB Your next dose is: Today Dose:  10 mEq Take 10 mEq by mouth. 2 tabs 2 times daily Refills:  0  
     
   
   
   
  
 pravastatin 80 mg tablet Commonly known as:  PRAVACHOL Your next dose is: Today Dose:  80 mg Take 1 Tab by mouth nightly. Quantity:  30 Tab Refills:  5  
     
   
   
   
  
  
 predniSONE 10 mg dose pack Commonly known as:  STERAPRED DS Dose:  10 mg Take 10 mg by mouth See Admin Instructions. See administration instruction per 10mg dose pack---per wife to finish 2/7/17 Refills:  0  
     
   
   
   
  
 temazepam 15 mg capsule Commonly known as:  RESTORIL  
   
 ONE  PO NIGHTLY PRN FOR SLEEP Quantity:  30 Cap Refills:  5  
     
   
   
   
  
 tenofovir 300 mg tablet Commonly known as:  [place] Drones Your next dose is:  Tomorrow Dose:  300 mg Take 300 mg by mouth every morning. Refills:  0 ZyrTEC 10 mg Cap Generic drug:  Cetirizine Your next dose is:  Tomorrow Dose:  1 Tab Take 1 Tab by mouth daily. Refills:  0  
     
  
   
   
   
  
 * Notice:   This list has 2 medication(s) that are the same as other medications prescribed for you. Read the directions carefully, and ask your doctor or other care provider to review them with you. STOP taking these medications   
 finasteride 5 mg tablet Commonly known as:  PROSCAR Where to Get Your Medications These medications were sent to [address] [place], [place] [address], [place] ([place]  [address] [place] [address], [address] Phone:  [phone]  
  trimethoprim-sulfamethoxazole 160-800 mg per tablet Information on where to get these meds will be given to you by the nurse or doctor. ! Ask your nurse or doctor about these medications HYDROcodone-acetaminophen 5-325 mg per tablet Discharge Instructions DISCHARGE SUMMARY from Nurse The following personal items are in your possession at time of discharge: 
 
Dental Appliances: Uppers Visual Aid: None Hearing Aids/Status: Bilateral 
Home Medications: None Jewelry: None Clothing: Belt, Jacket/Coat, Shirt, Comfort, [employer] Other Valuables: Quintella Pack PATIENT INSTRUCTIONS: 
 
After general anesthesia or intravenous sedation, for 24 hours or while taking prescription Narcotics: · Limit your activities · Do not drive and operate hazardous machinery · Do not make important personal or business decisions · Do  not drink alcoholic beverages · If you have not urinated within 8 hours after discharge, please contact your surgeon on call. Report the following to your surgeon: 
· Excessive pain, swelling, redness or odor of or around the surgical area · Temperature over 100.5 · Nausea and vomiting lasting longer than 4 hours or if unable to take medications · Any signs of decreased circulation or nerve impairment to extremity: change in color, persistent  numbness, tingling, coldness or increase pain · Any questions What to do at Home: Recommended activity: Activity as tolerated, no driving while taking pain medications, no strenuous activity until approved by MD.  
 
If you experience any of the following symptoms fever > 101, persistent nausea and vomiting, new or unrelieved pain, dizziness, chest pain, shortness of breath, inability to urinate, bloody urine with clots, please follow up with Dr. Yady Monterroso. . 
 
 
*  Please give a list of your current medications to your Primary Care Provider. *  Please update this list whenever your medications are discontinued, doses are 
    changed, or new medications (including over-the-counter products) are added. *  Please carry medication information at all times in case of emergency situations. These are general instructions for a healthy lifestyle: No smoking/ No tobacco products/ Avoid exposure to second hand smoke Surgeon General's Warning:  Quitting smoking now greatly reduces serious risk to your health. Obesity, smoking, and sedentary lifestyle greatly increases your risk for illness A healthy diet, regular physical exercise & weight monitoring are important for maintaining a healthy lifestyle You may be retaining fluid if you have a history of heart failure or if you experience any of the following symptoms:  Weight gain of 3 pounds or more overnight or 5 pounds in a week, increased swelling in our hands or feet or shortness of breath while lying flat in bed. Please call your doctor as soon as you notice any of these symptoms; do not wait until your next office visit. Recognize signs and symptoms of STROKE: 
 
F-face looks uneven A-arms unable to move or move unevenly S-speech slurred or non-existent T-time-call 911 as soon as signs and symptoms begin-DO NOT go Back to bed or wait to see if you get better-TIME IS BRAIN. Warning Signs of HEART ATTACK Call 911 if you have these symptoms: ? Chest discomfort. Most heart attacks involve discomfort in the center of the chest that lasts more than a few minutes, or that goes away and comes back. It can feel like uncomfortable pressure, squeezing, fullness, or pain. ? Discomfort in other areas of the upper body. Symptoms can include pain or discomfort in one or both arms, the back, neck, jaw, or stomach. ? Shortness of breath with or without chest discomfort. ? Other signs may include breaking out in a cold sweat, nausea, or lightheadedness. Don't wait more than five minutes to call [address]! Fast action can save your life. Calling 911 is almost always the fastest way to get lifesaving treatment. Emergency Medical Services staff can begin treatment when they arrive  up to an hour sooner than if someone gets to the hospital by car. The discharge information has been reviewed with the patient. The patient verbalized understanding. Discharge medications reviewed with the patient and appropriate educational materials and side effects teaching were provided. Discharge Instructions Attachments/References TURP (TRANSURETHRAL RESECTION OF THE PROSTATE): POST-OP (ENGLISH) CYSTOSCOPY: POST-OP (ENGLISH) Discharge Orders None ACO Transitions of Care Introducing [employer] [address] Mariia Lafleur offers a voluntary care coordination program to provide high quality service and care to [facility] fee-for-service beneficiaries. Wendy Backer was designed to help you enhance your health and well-being through the following services: ? Transitions of Care  support for individuals who are transitioning from one care setting to another (example: Hospital to home). ?  Chronic and Complex Care Coordination  support for individuals and caregivers of those with serious or chronic illnesses or with more than one chronic (ongoing) condition and those who take a number of different medications. If you meet specific medical criteria, a [address] [place] [address] Rd may call you directly to coordinate your care with your primary care physician and your other care providers. For questions about the [facility] programs, please, contact your physicians office. For general questions or additional information about Accountable Care Organizations: 
Please visit www.medicare.gov/acos. html or call 1-800-MEDICARE (9-435.304.9105) TTY users should call 0-629.955.1822. Introducing [facility]! [employer] introduces [employer] patient portal. Now you can access parts of your medical record, email your doctor's office, and request medication refills online. 1. In your internet browser, go to [email or web address] 2. Click on the First Time User? Click Here link in the Sign In box. You will see the New Member Sign Up page. 3. Enter your [employer] Access Code exactly as it appears below. You will not need to use this code after youve completed the sign-up process. If you do not sign up before the expiration date, you must request a new code. · [employer] Access Code: [email or web address] Expires: 3/6/2017  9:27 PM 
 
4. Enter the last four digits of your Social Security Number (xxxx) and Date of Birth (mm/dd/yyyy) as indicated and click Submit. You will be taken to the next sign-up page. 5. Create a Aligned Tele[employer] ID. This will be your [employer] login ID and cannot be changed, so think of one that is secure and easy to remember. 6. Create a [employer] password. You can change your password at any time. 7. Enter your Password Reset Question and Answer. This can be used at a later time if you forget your password. 8. Enter your e-mail address. You will receive e-mail notification when new information is available in [address] 9. Click Sign Up. You can now view and download portions of your medical record. 10. Click the Download Summary menu link to download a portable copy of your medical information. If you have questions, please visit the Frequently Asked Questions section of the [employer] website. Remember, [employer] is NOT to be used for urgent needs. For medical emergencies, dial 911. Now available from your iPhone and Android! General Information Please provide this summary of care documentation to your next provider. Patient Signature:  ____________________________________________________________ Date:  ____________________________________________________________  
  
Juan Kim Provider Signature:  ____________________________________________________________ Date:  ____________________________________________________________ More Information Transurethral Resection of the Prostate (TURP): What to Expect at [facility] Your Recovery Transurethral resection of the prostate (TURP) is surgery to remove a section of the prostate gland. This is done when the prostate gland has grown too large. The prostate gland is a walnut-sized organ in men that grows around the urethra. The urethra is the tube that carries urine from the bladder, through the penis, to outside the body. The prostate gland produces most of the fluid in semen. You may need a urinary catheter for a short time. A urinary catheter is a flexible plastic tube used to drain urine from your bladder when you cannot urinate on your own. If it is still in place when you go home, your doctor will give you instructions on how to care for your catheter. For several days after surgery, you may feel burning when you urinate. Your urine may be pink for 1 to 3 weeks after surgery. You also may have bladder cramps, or spasms. Your doctor may give you medicine to help control the spasms. You may still feel like you need to urinate often in the weeks after your surgery. It often takes up to 6 weeks for this to get better. Once you have healed, you may have less trouble urinating. You may have better control over starting and stopping your urine stream and feel like you get more relief when you urinate. Most men can return to work or many of their usual activities in 1 to 3 weeks. But for about 6 weeks, try to avoid heavy lifting and strenuous activities that might put extra pressure on your bladder. Most men still can have erections after surgery (if they were able to have them before surgery), but they may not ejaculate when they have an orgasm. Semen may go into the bladder instead of out through the penis. This is called retrograde ejaculation. This does not hurt and is not harmful to your health. But it may mean that you will not be able to father a child. If this is a concern, talk to your doctor about saving your sperm before the surgery. This care sheet gives you a general idea about how long it will take for you to recover. But each person recovers at a different pace. Follow the steps below to get better as quickly as possible. How can you care for yourself at home? Activity · Rest when you feel tired. Getting enough sleep will help you recover. · Try to walk each day. Start by walking a little more than you did the day before. Bit by bit, increase the amount you walk. Walking boosts blood flow and helps prevent pneumonia and constipation. · Avoid strenuous activities for 6 weeks after surgery, or until your doctor says it is okay. This includes bicycle riding, jogging, weight lifting, or aerobic exercise. · For 6 weeks, avoid lifting anything that would make you strain. This may include a child, heavy grocery bags and milk containers, a heavy briefcase or backpack, cat litter or dog food bags, or a vacuum . · Ask your doctor when you can drive again. · You will probably need to take 1 to 3 weeks off from work. It depends on the type of work you do and how you feel. · Do not put anything in your rectum, such as an enema or suppository, for 4 to 6 weeks after the surgery. · You may shower and take baths when your doctor says it is okay. · Ask your doctor when it is okay for you to have sex. Diet · You can eat your normal diet. If your stomach is upset, try bland, low-fat foods like plain rice, broiled chicken, toast, and yogurt. · Drink plenty of fluids (unless your doctor tells you not to). · You may notice that your bowel movements are not regular right after your surgery. This is common. Try to avoid constipation and straining with bowel movements. You may want to take a fiber supplement every day. If you have not had a bowel movement after a couple of days, ask your doctor about taking a mild laxative. Medicines · Your doctor will tell you if and when you can restart your medicines. He or she will also give you instructions about taking any new medicines. · If you take blood thinners, such as warfarin (Coumadin), clopidogrel (Plavix), or aspirin, be sure to talk to your doctor. He or she will tell you if and when to start taking those medicines again. Make sure that you understand exactly what your doctor wants you to do. · Be safe with medicines. Take pain medicines exactly as directed. ¨ If the doctor gave you a prescription medicine for pain, take it as prescribed. ¨ If you are not taking a prescription pain medicine, ask your doctor if you can take an over-the-counter medicine. · If you think your pain medicine is making you sick to your stomach: 
¨ Take your medicine after meals (unless your doctor has told you not to). ¨ Ask your doctor for a different pain medicine. · If your doctor prescribed antibiotics, take them as directed. Do not stop taking them just because you feel better. You need to take the full course of antibiotics. Follow-up care is a key part of your treatment and safety. Be sure to make and go to all appointments, and call your doctor if you are having problems. It's also a good idea to know your test results and keep a list of the medicines you take. When should you call for help? Call 911 anytime you think you may need emergency care. For example, call if: 
· You passed out (lost consciousness). · You have severe trouble breathing. · You have sudden chest pain and shortness of breath, or you cough up blood. Call your doctor now or seek immediate medical care if: 
· You cannot urinate. · You are leaking or dripping urine. · You have pain that does not get better after you take pain medicine. · You are sick to your stomach or cannot keep fluids down. · You have pain in your back just below your rib cage. This is called flank pain. · You have a fever, chills, or body aches. · You have signs of a blood clot, such as: 
¨ Pain in your calf, back of the knee, thigh, or groin. ¨ Redness and swelling in your leg or groin. Watch closely for changes in your health, and be sure to contact your doctor if: 
· You do not have a bowel movement after taking a laxative. Where can you learn more? Go to http://zana-jovana.info/. Enter M108 in the search box to learn more about \"Transurethral Resection of the Prostate (TURP): What to Expect at Home. \" Current as of: May 24, 2016 Content Version: 11.1 © 5921-7766 [employer]. Care instructions adapted under license by [employer] (which disclaims liability or warranty for this information). If you have questions about a medical condition or this instruction, always ask your healthcare professional. [place] [address] any warranty or liability for your use of this information. Cystoscopy: What to Expect at [facility] Your Recovery A cystoscopy is a procedure that lets a doctor look inside of the bladder and the urethra. The urethra is the tube that carries urine from the bladder to outside the body. The doctor uses a thin, lighted tube called a cystoscope to look for kidney or bladder stones, tumors, bleeding, or infection. After the cystoscopy, your urethra may be sore at first, and it may burn when you urinate for the first few days after the procedure. You may feel the need to urinate more often, and your urine may be pink. These symptoms should get better in 1 or 2 days. You will probably be able to go back to work or most of your usual activities in 1 or 2 days. This care sheet gives you a general idea about how long it will take for you to recover. But each person recovers at a different pace. Follow the steps below to get better as quickly as possible. How can you care for yourself at home? Activity · Rest when you feel tired. Getting enough sleep will help you recover. · Try to walk each day. Start by walking a little more than you did the day before. Bit by bit, increase the amount you walk. Walking boosts blood flow and helps prevent pneumonia and constipation. · Avoid strenuous activities, such as bicycle riding, jogging, weight lifting, or aerobic exercise, until your doctor says it is okay. · Ask your doctor when you can drive again. · Most people are able to return to work within 1 or 2 days after the procedure. · You may shower and take baths as usual. 
· Ask your doctor when it is okay for you to have sex. Diet · You can eat your normal diet. If your stomach is upset, try bland, low-fat foods like plain rice, broiled chicken, toast, and yogurt. · Drink plenty of fluids (unless your doctor tells you not to). Medicines · Take pain medicines exactly as directed. ¨ If the doctor gave you a prescription medicine for pain, take it as prescribed. ¨ If you are not taking a prescription pain medicine, ask your doctor if you can take an over-the-counter medicine. · If you think your pain medicine is making you sick to your stomach: 
¨ Take your medicine after meals (unless your doctor has told you not to). ¨ Ask your doctor for a different pain medicine. · If your doctor prescribed antibiotics, take them as directed. Do not stop taking them just because you feel better. You need to take the full course of antibiotics. Follow-up care is a key part of your treatment and safety. Be sure to make and go to all appointments, and call your doctor if you are having problems. It's also a good idea to know your test results and keep a list of the medicines you take. When should you call for help? Call 911 anytime you think you may need emergency care. For example, call if: 
· You passed out (lost consciousness). · You have severe trouble breathing. · You have sudden chest pain and shortness of breath, or you cough up blood. · You have severe belly pain. Call your doctor now or seek immediate medical care if: 
· You are sick to your stomach or cannot keep fluids down. · Your urine is still red or you see blood clots after you have urinated several times. · You have trouble passing urine or stool, especially if you have pain or swelling in your lower belly. · You have signs of a blood clot, such as: 
¨ Pain in your calf, back of the knee, thigh, or groin. ¨ Redness and swelling in your leg or groin. · You develop a fever or severe chills. · You have pain in your back just below your rib cage. This is called flank pain. Watch closely for changes in your health, and be sure to contact your doctor if: 
· You have pain or burning when you urinate. A burning feeling is normal for a day or two after the test, but call if it does not get better. · You have a frequent urge to urinate but can pass only small amounts of urine. · Your urine is pink, red, or cloudy, or smells bad.  It is normal for the urine to have a pinkish color for a few days after the test, but call if it does not get better. Where can you learn more? Go to http://zana-jovana.info/. Enter Q072 in the search box to learn more about \"Cystoscopy: What to Expect at Home. \" Current as of: August 12, 2016 Content Version: 11.1 © 4670-2897 [employer]. Care instructions adapted under license by [employer] (which disclaims liability or warranty for this information). If you have questions about a medical condition or this instruction, always ask your healthcare professional. [address] any warranty or liability for your use of this information.

## 2017-02-13 NOTE — ANESTHESIA PREPROCEDURE EVALUATION
Anesthetic History   No history of anesthetic complications            Review of Systems / Medical History  Patient summary reviewed, nursing notes reviewed and pertinent labs reviewed    Pulmonary  Within defined limits                 Neuro/Psych         Psychiatric history     Cardiovascular    Hypertension: well controlled        Dysrhythmias : atrial fibrillation  CAD, CABG (s/p CABG 2008) and hyperlipidemia    Exercise tolerance: >4 METS     GI/Hepatic/Renal     GERD: well controlled      Liver disease (Hep B)     Endo/Other      Hypothyroidism: well controlled  Arthritis     Other Findings              Physical Exam    Airway  Mallampati: II  TM Distance: 4 - 6 cm  Neck ROM: normal range of motion   Mouth opening: Normal     Cardiovascular    Rhythm: regular  Rate: normal         Dental    Dentition: Full upper dentures     Pulmonary  Breath sounds clear to auscultation               Abdominal         Other Findings            Anesthetic Plan    ASA: 3  Anesthesia type: spinal            Anesthetic plan and risks discussed with: Patient and Family      Pt and family prefer SAB

## 2017-02-13 NOTE — ANESTHESIA PROCEDURE NOTES
Spinal Block    Start time: 2/13/2017 4:15 PM  End time: 2/13/2017 4:18 PM  Performed by: Jeff Bartholomew  Authorized by: Jeff Bartholomew     Pre-procedure:   Indications: primary anesthetic  Preanesthetic Checklist: patient identified, risks and benefits discussed, anesthesia consent, site marked, patient being monitored and timeout performed    Timeout Time: 16:14          Spinal Block:   Patient Position:  Seated  Prep Region:  Lumbar  Prep: chlorhexidine      Location:  L4-5  Technique:  Single shot  Local:  Lidocaine 1%  Local Dose (mL):  3    Needle:   Needle Type:  Pencil-tip  Needle Gauge:  25 G  Attempts:  1      Events: CSF confirmed, no blood with aspiration and no paresthesia        Assessment:  Insertion:  Uncomplicated  Patient tolerance:  Patient tolerated the procedure well with no immediate complications

## 2017-02-13 NOTE — PERIOP NOTES
TRANSFER - OUT REPORT:    Verbal report given to Nenita Domingo RN(name) on Patel Brown  being transferred to [facility](unit) for routine post - op       Report consisted of patients Situation, Background, Assessment and   Recommendations(SBAR). Information from the following report(s) SBAR, OR Summary, Procedure Summary, Intake/Output and MAR was reviewed with the receiving nurse. Lines:   Venous Access Device port 10/05/16 Upper chest (subclavicular area, right (Active)       Peripheral IV 10/05/16 Left Hand (Active)       Peripheral IV 11/23/16 (Active)       Peripheral IV 02/13/17 Right Forearm (Active)   Site Assessment Clean, dry, & intact 2/13/2017  6:05 PM   Phlebitis Assessment 0 2/13/2017  6:05 PM   Infiltration Assessment 0 2/13/2017  6:05 PM   Dressing Status Clean, dry, & intact 2/13/2017  6:05 PM   Dressing Type Transparent 2/13/2017  6:05 PM   Hub Color/Line Status Pink; Infusing 2/13/2017  6:05 PM   Alcohol Cap Used No 2/13/2017  6:05 PM        Opportunity for questions and clarification was provided. Patient transported with:   O2 @ 3 liters    VTE prophylaxis orders have been written for Patel Brown. Patient given room number and nurses name.   Family updated and sent up to room

## 2017-02-13 NOTE — BRIEF OP NOTE
BRIEF OPERATIVE NOTE    Date of Procedure: 2/13/2017   Preoperative Diagnosis: Retention of urine [R33.9]  Benign prostatic hypertrophy with urinary retention [N40.1, R33.8]  Postoperative Diagnosis: Retention of urine [R33.9]  Benign prostatic hypertrophy with urinary retention [N40.1, R33.8]    Procedure(s):  CYSTOSCOPY TRANSURETHRAL RESECTION OF PROSTATE  Surgeon(s) and Role:     * Kevon Mccann MD - Primary            Surgical Staff:  Circ-1: Hakeem Domingo RN  Circ-Relief: Olive Dorado RN  Event Time In   Incision Start 1635   Incision Close 1729     Anesthesia: General   Estimated Blood Loss: 50 ml  Specimens:   ID Type Source Tests Collected by Time Destination   1 : prostate chips Preservative Prostate  Kevon Mccann MD 2/13/2017 1722 Pathology      Findings: see op note   Complications: none  Implants: * No implants in log *

## 2017-02-13 NOTE — ANESTHESIA POSTPROCEDURE EVALUATION
Post-Anesthesia Evaluation and Assessment    Patient: Palmer Quiroz MRN: [id number]  SSN: [id number]    YOB: 1940  Age: 68 y.o. Sex: male       Cardiovascular Function/Vital Signs  Visit Vitals    BP 97/60    Pulse 91    Temp 36.4 °C (97.5 °F)    Resp 16    Ht 5' 6\" (1.676 m)    Wt 55.3 kg (122 lb)    SpO2 99%    BMI 19.69 kg/m2       Patient is status post spinal anesthesia for Procedure(s):  CYSTOSCOPY TRANSURETHRAL RESECTION OF PROSTATE. Nausea/Vomiting: None    Postoperative hydration reviewed and adequate. Pain:  Pain Scale 1: Numeric (0 - 10) (02/13/17 1805)  Pain Intensity 1: 0 (02/13/17 1805)   Managed    Neurological Status:   Neuro (WDL): Within Defined Limits (02/13/17 1805)  Neuro  LUE Motor Response: Purposeful (02/13/17 1805)  LLE Motor Response: Numbness (02/13/17 1805)  RUE Motor Response: Purposeful (02/13/17 1805)  RLE Motor Response: Numbness (02/13/17 1805)   At baseline    Mental Status and Level of Consciousness: Arousable    Pulmonary Status:   O2 Device: Nasal cannula (02/13/17 1805)   Adequate oxygenation and airway patent    Complications related to anesthesia: None    Post-anesthesia assessment completed.  No concerns    Signed By: Daniel Gibson MD     February 13, 2017

## 2017-02-13 NOTE — PROGRESS NOTES
TRANSFER - IN REPORT:    Verbal report received from [facility] RN(name) on Kelly Coughlin  being received from [employer]) for routine post - op      Report consisted of patients Situation, Background, Assessment and   Recommendations(SBAR). Information from the following report(s) SBAR and Kardex was reviewed with the receiving nurse. Opportunity for questions and clarification was provided. Assessment completed upon patients arrival to unit and care assumed.

## 2017-02-14 PROBLEM — R33.9 URINARY RETENTION: Status: ACTIVE | Noted: 2017-01-01

## 2017-02-14 NOTE — PROGRESS NOTES
Care Management Interventions  PCP Verified by CM: Yes  Transition of Care Consult (CM Consult): Discharge Planning  Current Support Network: Lives with Spouse, Family Lives Nearby  Confirm Follow Up Transport: Family  Plan discussed with Pt/Family/Caregiver: Yes  Freedom of Choice Offered: Yes  Discharge Location  Discharge Placement: Home with family assistance    DICK met with patient this date to initiate D/C planning. Pt is alert and oriented in all spheres. SW discussed with the patient Observation Status/ Patient Guide to Observation Status provided to patient, patient deferrred signing stating his DIL Best Patel is his POA and \"takes care of all that, copy placed on chart and copy left with patient w SW contact info. Pt stated he lives with spouse STAFFEJTORP. He is not currently driving but plans to resume soon after returning home. Pt had a recent eye surgery. Pt stated he uses no DME at home, pt declines HH at discharge stating he has plenty of family support, states that DIL is a nurse and can assist as needed. Pt expressed no anticipated needs at D/C and is hopeful to return home tomorrow. SW will remain available if needed.

## 2017-02-14 NOTE — PROGRESS NOTES
Problem: Mobility Impaired (Adult and Pediatric)  Goal: *Acute Goals and Plan of Care (Insert Text)  LTG:  (1.)Mr. Elisa Rose will move from supine to sit and sit to supine , scoot up and down and roll side to side INDEPENDENTLY with bed flat within 7 day(s). (2.)Mr. Elisa Rose will transfer from bed to chair and chair to bed with MODIFIED INDEPENDENCE using the least restrictive device within 7 day(s). (3.)Mr. Elisa Rose will ambulate with MODIFIED INDEPENDENCE for 500+ feet with the least restrictive device within 7 day(s). (4.)Mr. Elisa Rose will ascend and descend 4 steps with SUPERVISION using handrail(s) and/or least restrictive device within 7 days. ________________________________________________________________________________________________       PHYSICAL THERAPY: INITIAL ASSESSMENT, AM 2/14/2017  OBSERVATION: Hospital Day: 2  [place] / [place] / Product Type: Medicare /      NAME/AGE/GENDER: Sandi Cartagena is a 68 y.o. male          PRIMARY DIAGNOSIS: Retention of urine [R33.9]  Benign prostatic hypertrophy with urinary retention [N40.1, R33.8] Urinary retention Urinary retention  Procedure(s) (LRB):  CYSTOSCOPY TRANSURETHRAL RESECTION OF PROSTATE (N/A)  1 Day Post-Op  ICD-10: Treatment Diagnosis:       · Generalized Muscle Weakness (M62.81)  · Difficulty in walking, Not elsewhere classified (R26.2)  · Other abnormalities of gait and mobility (R26.89)   Precaution/Allergies:  Iodinated contrast media - oral and iv dye; Protonix [pantoprazole]; and Red dye       ASSESSMENT:      Mr. Elisa Rose is a 68year old male seen s/p TURP for urinary retention. He presents in supine and is eager to move. Denies pain at rest. Pt lives with wife and is fairly independent at baseline but has been somewhat limited by multiple medical issues and procedures recently. He transfers to sitting at edge of bed with SBA. LE assessment reveals AROM WFL with generally decreased strength bilaterally.  Sensation intact/equal. Performs sit-stand transfers with SBA and ambulates ~230 ft in room and hallway with walker for support and CGA-SBA for safety. Demonstrates slow but fairly steady gait with cueing needed for posture and walker management. Pt without c/o during ambulation and denies shortness of breath. Returned to room and up to chair afterwards with LEs elevated and needs in reach. O2 sats after activity on room air are 92%. Mr. Kenneth Briggs is currently demonstrating mild post op deficits with strength, ambulation, and activity tolerance. He will benefit from continued therapy to address deficits and maximize safety/independence with mobility. Discharge needs TBD pending progress with therapy; likely home with possible  PT.     Mr. Kenneth Briggs was discharged from our facility before further treatment could be provided in this setting. This section established at most recent assessment   PROBLEM LIST (Impairments causing functional limitations):  1. Decreased Strength  2. Decreased Transfer Abilities  3. Decreased Ambulation Ability/Technique  4. Decreased Balance  5. Decreased Activity Tolerance    INTERVENTIONS PLANNED: (Benefits and precautions of physical therapy have been discussed with the patient.)  1. Balance Exercise  2. Bed Mobility  3. Gait Training  4. Therapeutic Activites  5. Therapeutic Exercise/Strengthening  6. Transfer Training  7. Group Therapy      TREATMENT PLAN: Frequency/Duration: 3 times a week for duration of hospital stay  Rehabilitation Potential For Stated Goals: GOOD      RECOMMENDED REHABILITATION/EQUIPMENT: (at time of discharge pending progress): Continue Skilled Therapy and Home Health: Physical Therapy.                    HISTORY:   History of Present Injury/Illness (Reason for Referral):  Pt seen s/p TURP for urinary retention  Past Medical History/Comorbidities:   Mr. Kenneth Briggs  has a past medical history of Acquired thrombocytopenia; CAD (coronary artery disease); CLL (chronic lymphocytic leukemia) ([facility] [place] [address] Coagulation defects; Depression; Enlarged prostate; GERD (gastroesophageal reflux disease); Hepatitis B; History of kidney stones; Hypokalemia; Hypomagnesemia; Hypothyroidism; PAF (paroxysmal atrial fibrillation) ([facility] [place] [address] PUD (peptic ulcer disease) (2009); Retinal detachment; RLL pneumonia (5/2013); and Thromboembolus ([facility] [place] [address] (2013). He also has no past medical history of Adverse effect of anesthesia; Difficult intubation; Malignant hyperthermia due to anesthesia; Nausea & vomiting; or Pseudocholinesterase deficiency. Mr. Araceli Zavala  has a past surgical history that includes cabg, artery-vein, four (4/2008); urological; orthopaedic (8238'B); orthopaedic (9693'R); hernia repair (2010); lap cholecystectomy (9/12); heent (2/18/2016); and heent (11/2016 at [facility]). Social History/Living Environment:   Home Environment: Private residence  # Steps to Enter: 4  Rails to Enter: Yes  One/Two Story Residence: One story  Living Alone: No  Support Systems: Spouse/Significant Other/Partner, Child(hayde)  Patient Expects to be Discharged to[de-identified] Private residence  Current DME Used/Available at Home: [employer] Welsh, handy, Walker, rolling  Prior Level of Function/Work/Activity:  Mr. Araceli Zavala lives with wife in single story home with a few steps to enter. Daughter lives next door. Pt reports has always been very independent but has slowed down recently due to multiple medical issues and surgeries/procedures. Number of Personal Factors/Comorbidities that affect the Plan of Care: Hx leukemia  Weight loss  Multiple recent medical issues/procedures 1-2: MODERATE COMPLEXITY   EXAMINATION:   Most Recent Physical Functioning:   Gross Assessment:  AROM: Within functional limits  Strength: Generally decreased, functional  Coordination: Within functional limits  Sensation: Intact               Posture:  Posture (WDL): Exceptions to WDL  Posture Assessment:  Forward head, Rounded shoulders  Balance:  Sitting: Intact  Standing: Impaired  Standing - Static: Good  Standing - Dynamic : Fair (+) Bed Mobility:  Rolling: Stand-by asssistance  Supine to Sit: Stand-by asssistance  Scooting: Stand-by asssistance  Wheelchair Mobility:     Transfers:  Sit to Stand: Stand-by asssistance  Stand to Sit: Stand-by asssistance  Bed to Chair: Contact guard assistance  Gait:     Base of Support: Narrowed  Speed/Georgina: Pace decreased (<100 feet/min); Slow  Step Length: Left shortened;Right shortened  Gait Abnormalities: Trunk sway increased;Decreased step clearance  Distance (ft): 230 Feet (ft)  Assistive Device: Walker, rolling  Ambulation - Level of Assistance: Contact guard assistance;Stand-by asssistance  Interventions: Verbal cues; Visual/Demos; Safety awareness training       Body Structures Involved:  1. Eyes and Ears  2. Muscles Body Functions Affected:  1. Sensory/Pain  2. Movement Related Activities and Participation Affected:  1. Self Care  2. Domestic Life  3. Community, Social and [place]   Number of elements that affect the Plan of Care: 4+: HIGH COMPLEXITY   CLINICAL PRESENTATION:   Presentation: Stable and uncomplicated: LOW COMPLEXITY   CLINICAL DECISION MAKIN [facility] Mobility Inpatient Short Form  How much difficulty does the patient currently have. .. Unable A Lot A Little None   1. Turning over in bed (including adjusting bedclothes, sheets and blankets)? [ ] 1   [ ] 2   [ ] 3   [X] 4   2. Sitting down on and standing up from a chair with arms ( e.g., wheelchair, bedside commode, etc.)   [ ] 1   [ ] 2   [ ] 3   [X] 4   3. Moving from lying on back to sitting on the side of the bed? [ ] 1   [ ] 2   [ ] 3   [X] 4   How much help from another person does the patient currently need. .. Total A Lot A Little None   4. Moving to and from a bed to a chair (including a wheelchair)? [ ] 1   [ ] 2   [X] 3   [ ] 4   5. Need to walk in hospital room? [ ] 1   [ ] 2   [X] 3   [ ] 4   6.   Climbing 3-5 steps with a railing? [ ] 1   [ ] 2   [X] 3   [ ] 4   © 2007, Trustees of [address] [place] [address] [address] [address], under license to [employer]. All rights reserved    Score:  Initial: 21 Most Recent: X (Date: -- )     Interpretation of Tool:  Represents activities that are increasingly more difficult (i.e. Bed mobility, Transfers, Gait). Score 24 23 22-20 19-15 14-10 9-7 6       Modifier CH CI CJ CK CL CM CN         · Mobility - Walking and Moving Around:               - CURRENT STATUS:    CJ - 20%-39% impaired, limited or restricted               - GOAL STATUS:           CH - 0% impaired, limited or restricted               - D/C STATUS:                       CJ - 20%-39% impaired, limited or restricted  [place] / [place] / Product Type: Medicare /       Medical Necessity:     · Patient demonstrates good rehab potential due to higher previous functional level. Reason for Services/Other Comments:  · Patient continues to demonstrate capacity to improve strength, mobility, balance, activity tolerance which will increase independence and increase safety.    Use of outcome tool(s) and clinical judgement create a POC that gives a: Clear prediction of patient's progress: LOW COMPLEXITY                 TREATMENT:   (In addition to Assessment/Re-Assessment sessions the following treatments were rendered)   Pre-treatment Symptoms/Complaints:  \"I want to get out of this bed\"  Pain: Initial:   Pain Intensity 1: 0  Post Session:  No complaints, no pain      Assessment/Reassessment only, no treatment provided today     Braces/Orthotics/Lines/Etc:   · O2 Device: Room air  Treatment/Session Assessment:    · Response to Treatment:  Pt without complications; ambulates 230 ft with walker and no increase in pain  · Interdisciplinary Collaboration:  · Physical Therapist  · Registered Nurse  · After treatment position/precautions:  · Up in chair  · Bed/Chair-wheels locked  · Bed in low position  · Call light within reach  · Compliance with Program/Exercises: Will assess as treatment progresses. · Recommendations/Intent for next treatment session: \"Next visit will focus on advancements to more challenging activities and reduction in assistance provided\".   Total Treatment Duration:  PT Patient Time In/Time Out  Time In: 1005  Time Out: [facility]

## 2017-02-14 NOTE — OP NOTES
[address]   OPERATIVE REPORT       Name:  Angelina Tsang   MR#:  [id number]   :  1940   Account #:  [de-identified]   Date of Adm:  2017       DATE OF SURGERY: 2017    PREOPERATIVE DIAGNOSES:    1. BPH  2. Urinary retention. POSTOPERATIVE DIAGNOSES:    1. BPH    2. Urinary retention. PROCEDURE: Transurethral resection of prostate. SURGEON: Juliane Hill. Luis Angel Ledesma MD.    FINDINGS: The prostatic fossa was obstructed bilateral lobe   hypertrophy. DESCRIPTION OF PROCEDURE: The patient was given a general   anesthetic and placed in the dorsal lithotomy position. His   Osborn catheter was removed. He was prepped and draped in a   sterile fashion. I dilated the distal urethra to 28-Sammarinese using [employer]   sounds. The 28-Sammarinese continuous flow resectoscope sheath was   then passed over an obturator into the bladder. The resectoscope   with 30 degree lens and video camera was used. The bladder was inspected. There was 2+ trabeculation. No   stones, tumors, or injury was noted. Both ureteral orifices were   seen to be normal.    Prostatic fossa was obstructed bilateral lobe and hypertrophy as   well as some posterior adenoma. I began the resection at the 6   o'clock position at the bladder neck and carried this down to   the level of the verumontanum. The surgical capsule was easily   visualized. The right lateral lobes and left lateral lobes were   resected from anterior to posterior, again, with the distal   extent being the verumontanum. There was minimal anterior tissue   and this was not resected. The chips were irrigated out of the bladder using a Alyssa   syringe. We used the coag loop for hemostasis. At the end of the   procedure, there were no chips remaining and they were irrigated   out with Alyssa syringe and sent in formalin to Pathology.  The   scope was removed and a 24-Sammarinese Osborn catheter was inserted,   balloon inflated with 30 mL of water to continuous bladder   irrigation. The prostatic fossa had shown a good open channel   after the resection. There were no complications. Estimated   blood loss was about 50 mL.         Carole Saunders MD      [facility] [place]   D:  02/13/2017   17:39   T:  02/14/2017   09:27   Job #:  [id number]

## 2017-02-14 NOTE — PROGRESS NOTES
Subjective:   Daily Progress Note: 2017 8:01 AM  No Complaints  Objective:     Visit Vitals    BP 90/61    Pulse 89    Temp 97.5 °F (36.4 °C)    Resp 18    Ht 5' 6\" (1.676 m)    Wt 122 lb (55.3 kg)    SpO2 97%    BMI 19.69 kg/m2    O2 Flow Rate (L/min): 2 l/min O2 Device: Nasal cannula    Temp (24hrs), Av.8 °F (36.6 °C), Min:97.5 °F (36.4 °C), Max:98.1 °F (36.7 °C)       1901 -  0700  In: 4400 [I.V.:1400]  Out: 7300        [unfilled]  [unfilled]  [unfilled]    Exam: urine clear in moyer tubing. Data Review    Recent Results (from the past 24 hour(s))   TYPE & SCREEN    Collection Time: 17  1:30 PM   Result Value Ref Range    Crossmatch Expiration 2017     ABO/Rh(D) AB POSITIVE     Antibody screen NEG        Assessment   Principal Problem:    Urinary retention (2017)      Overview: Date of Procedure: 2017       Preoperative Diagnosis: Retention of urine [R33.9]      Benign prostatic hypertrophy with urinary retention [N40.1, R33.8]      Postoperative Diagnosis: Retention of urine [R33.9]      Benign prostatic hypertrophy with urinary retention [N40.1, R33.8]       Procedure(s):      CYSTOSCOPY TRANSURETHRAL RESECTION OF PROSTATE    Active Problems:    [facility] (2013)      Overview:   IGIV 200mg/kg Q 4 weeks started. BPH (benign prostatic hypertrophy) with urinary retention (2017)        Plan:   Will remove moyer in am. Needs PT.

## 2017-02-15 NOTE — DISCHARGE INSTRUCTIONS
DISCHARGE SUMMARY from Nurse    The following personal items are in your possession at time of discharge:    Dental Appliances: Uppers  Visual Aid: None  Hearing Aids/Status: Bilateral  Home Medications: None  Jewelry: None  Clothing: Belt, Jacket/Coat, Shirt, Pants, Socks  Other Valuables: Cane, Wallet             PATIENT INSTRUCTIONS:    After general anesthesia or intravenous sedation, for 24 hours or while taking prescription Narcotics:  · Limit your activities  · Do not drive and operate hazardous machinery  · Do not make important personal or business decisions  · Do  not drink alcoholic beverages  · If you have not urinated within 8 hours after discharge, please contact your surgeon on call. Report the following to your surgeon:  · Excessive pain, swelling, redness or odor of or around the surgical area  · Temperature over 100.5  · Nausea and vomiting lasting longer than 4 hours or if unable to take medications  · Any signs of decreased circulation or nerve impairment to extremity: change in color, persistent  numbness, tingling, coldness or increase pain  · Any questions        What to do at Home:  Recommended activity: Activity as tolerated, no driving while taking pain medications, no strenuous activity until approved by MD.     If you experience any of the following symptoms fever > 101, persistent nausea and vomiting, new or unrelieved pain, dizziness, chest pain, shortness of breath, inability to urinate, bloody urine with clots, please follow up with Dr. Raciel Alas. .      *  Please give a list of your current medications to your Primary Care Provider. *  Please update this list whenever your medications are discontinued, doses are      changed, or new medications (including over-the-counter products) are added. *  Please carry medication information at all times in case of emergency situations.           These are general instructions for a healthy lifestyle:    No smoking/ No tobacco products/ Avoid exposure to second hand smoke    Surgeon General's Warning:  Quitting smoking now greatly reduces serious risk to your health. Obesity, smoking, and sedentary lifestyle greatly increases your risk for illness    A healthy diet, regular physical exercise & weight monitoring are important for maintaining a healthy lifestyle    You may be retaining fluid if you have a history of heart failure or if you experience any of the following symptoms:  Weight gain of 3 pounds or more overnight or 5 pounds in a week, increased swelling in our hands or feet or shortness of breath while lying flat in bed. Please call your doctor as soon as you notice any of these symptoms; do not wait until your next office visit. Recognize signs and symptoms of STROKE:    F-face looks uneven    A-arms unable to move or move unevenly    S-speech slurred or non-existent    T-time-call 911 as soon as signs and symptoms begin-DO NOT go       Back to bed or wait to see if you get better-TIME IS BRAIN. Warning Signs of HEART ATTACK     Call 911 if you have these symptoms:   Chest discomfort. Most heart attacks involve discomfort in the center of the chest that lasts more than a few minutes, or that goes away and comes back. It can feel like uncomfortable pressure, squeezing, fullness, or pain.  Discomfort in other areas of the upper body. Symptoms can include pain or discomfort in one or both arms, the back, neck, jaw, or stomach.  Shortness of breath with or without chest discomfort.  Other signs may include breaking out in a cold sweat, nausea, or lightheadedness. Don't wait more than five minutes to call 911 - MINUTES MATTER! Fast action can save your life. Calling 911 is almost always the fastest way to get lifesaving treatment. Emergency Medical Services staff can begin treatment when they arrive -- up to an hour sooner than if someone gets to the hospital by car.        The discharge information has been reviewed with the patient. The patient verbalized understanding. Discharge medications reviewed with the patient and appropriate educational materials and side effects teaching were provided.

## 2017-02-15 NOTE — PROGRESS NOTES
Patient is running a fever of 99.2 and has increased to 99.8 and called Dr. Larissa Cook and he ordered a one time dose of ibuprofen 200 mg and a CBC with differential to draw in the morning.

## 2017-02-15 NOTE — PROGRESS NOTES
Attempted to go over d/c instructions with pt. Pt requesting I wait until his daughter [facility]) is here to go over instructions. Primary RN to notify me when daughter is here.

## 2017-02-15 NOTE — PROGRESS NOTES
Called Dr Cole Chowdhury regarding patient's discharge, he has only urinated 125ml and had only about 3 cups of drink and I bladder scanned him and the most he had was 50mL. Per Dr Siena Hebert MA ok to discharge patient.

## 2017-02-15 NOTE — PROGRESS NOTES
Subjective:   Daily Progress Note: 2/15/2017 8:32 AM  No Complaints  Objective:     Visit Vitals    /66    Pulse 84    Temp 97.6 °F (36.4 °C)    Resp 17    Ht 5' 6\" (1.676 m)    Wt 122 lb (55.3 kg)    SpO2 92%    BMI 19.69 kg/m2    O2 Flow Rate (L/min): 2 l/min O2 Device: Room air    Temp (24hrs), Av °F (36.7 °C), Min:97.3 °F (36.3 °C), Max:98.7 °F (37.1 °C)       1901 - 02/15 0700  In: 26908 [P.O.:240]  Out: 13691 [Urine:1000]       [unfilled]  [unfilled]  [unfilled]    Exam: urine clear,       Data Review    No results found for this or any previous visit (from the past 24 hour(s)). Assessment   Principal Problem:    Urinary retention (2017)      Overview: Date of Procedure: 2017       Preoperative Diagnosis: Retention of urine [R33.9]      Benign prostatic hypertrophy with urinary retention [N40.1, R33.8]      Postoperative Diagnosis: Retention of urine [R33.9]      Benign prostatic hypertrophy with urinary retention [N40.1, R33.8]       Procedure(s):      CYSTOSCOPY TRANSURETHRAL RESECTION OF PROSTATE    Active Problems:    CLL (chronic lymphocytic leukemia) ([facility] [address] (2013)      Overview:   IGIV 200mg/kg Q 4 weeks started. BPH (benign prostatic hypertrophy) with urinary retention (2017)        Plan:  Stable, will remove moyer, home when can void.

## 2017-02-15 NOTE — PROGRESS NOTES
Discharge instructions and prescriptions provided and explained to patient & family, both voiced understanding. Medication side effect sheet reviewed with pt. No home meds or valuables to return. Opportunity for questions provided.  Pt to be discharged after voiding trial.

## 2017-07-05 NOTE — PROGRESS NOTES
This note will not be viewable in [address] PATIENT OUTREACH    Initial attempt to schedule AWV appointment unsuccessful. Unable to reach patient. Message left for call back or to directly contact [facility] @ [phone] during regular office hours, if preferred, to schedule AWV appointment.

## 2017-09-22 PROBLEM — N40.1 BPH (BENIGN PROSTATIC HYPERTROPHY) WITH URINARY RETENTION: Chronic | Status: ACTIVE | Noted: 2017-01-01

## 2017-09-22 PROBLEM — Z87.442 HISTORY OF RENAL STONE: Chronic | Status: ACTIVE | Noted: 2017-01-01

## 2017-09-22 PROBLEM — Z86.718 HISTORY OF DVT (DEEP VEIN THROMBOSIS): Chronic | Status: ACTIVE | Noted: 2017-01-01

## 2017-09-22 PROBLEM — R33.8 BPH (BENIGN PROSTATIC HYPERTROPHY) WITH URINARY RETENTION: Chronic | Status: ACTIVE | Noted: 2017-01-01

## 2017-09-22 PROBLEM — R33.9 URINARY RETENTION: Status: RESOLVED | Noted: 2017-01-01 | Resolved: 2017-01-01

## 2017-09-22 PROBLEM — Z87.442 HISTORY OF RENAL STONE: Status: ACTIVE | Noted: 2017-01-01

## 2017-09-27 PROBLEM — E03.9 ACQUIRED HYPOTHYROIDISM: Chronic | Status: ACTIVE | Noted: 2017-01-01

## 2017-11-15 PROBLEM — R65.10 SIRS (SYSTEMIC INFLAMMATORY RESPONSE SYNDROME) (HCC): Status: ACTIVE | Noted: 2017-01-01

## 2017-11-15 PROBLEM — R65.10 SYSTEMIC INFLAMMATORY RESPONSE SYNDROME (HCC): Status: ACTIVE | Noted: 2017-01-01

## 2017-11-15 PROBLEM — C85.90 NON HODGKIN'S LYMPHOMA (HCC): Status: ACTIVE | Noted: 2017-01-01

## 2017-11-16 NOTE — PROGRESS NOTES
Pt summary: pt was alert, but confused in the morning, telling nurse a long story about his daughter in law and his house. But pt was agitated and attempting to get out of bed. Pt was medicated with haldol x 1 and ativan x 2 for agitation and appeared to receive relief. Pt coughing, thick sputum, suctioned with yankour for small amount of thick sputum. Checked every hour throughout the day, tab alert in placed, side rails up x 2, bed in lowest position. Safety maintained.

## 2017-11-16 NOTE — PROGRESS NOTES
Pt sitting on the side of the bed. States he wants to sit in the chair and look out the window. Pt attempted to stand up next to the bed, pt too weak to stand up. Assisted pt back to bed and repositioned.

## 2017-11-16 NOTE — PROGRESS NOTES
Gave report to Dr Guille Moran, he gave an order to access pt's port and discontinue the peripheral line, accessed port, pt tolerated without response. Flushed and blood return.

## 2017-11-16 NOTE — PROGRESS NOTES
11/16/17 1047   Pyschosocial Assessment 1   Concerns from previous vist? No  (Initial SW assesssment)   Significant changes in relationships or household members? No   Signs of abuse or neglect? No   Financial Need (No financial needs )   Pain signs needing to be reported to  No   Psychosocial Components/Teaching/Interventions Literature and/or review of nutrition/hydration in the terminal patient; Reviewed common signs/symptoms of dying process in the Pt/Family orientation handbook; Instructed on how to contact the agency with concerns; Emotional support/supportive listening   The patient has designated their health care surrogate Yes, [facility] copy on file     Visit Environment: visit was started in the pt room at bedside. Spouse and I moved to the family room nearby. Circumstances for Admission:Hospice Diagnosis: Systemic Inflammatory Response Syndrome     Associated Diagnoses: Chronic lymphocytic leukemia, new diagnosis lymphoma with splenomegaly,, bilateral leg cellulitis, right lower lobe pneumonia, COPD, CABG, chronic hepatitis B     Clinical summary: 77-year-old male with CLL and a white count of 186,000 and is currently under therapy. Other medical problems as outlined. He recently had what sounds like a bilateral inguinal herniorrhaphy with return to the emergency room with bilateral leg cellulitis complicated by  Right lower lobe pneumonia. All criteria for SIRS with lactic acidemia fever and pathologic leukocytosis. Serum creatinine of 1.7. BNP is greater than 1000. Thoracic and abdominal CT scans reveal the development of new thoracic and abdominal adenopathy compatible with lymphomatous transformation. Despite aggressive medical intervention the patient has not responded to therapy. It was not felt that further chemotherapy or other aggressive medical interventions would be of any benefit. The patient desires comfort measures at this time.   Patient is requiring intravenous doses of morphine and Haldol for comfort. The patient is a DNR. Patient's Previous Hospice Care: Pt is in his 1st benefit period of hospice with [facility]. He was transported her to the [place] [address] via ambulance and is in room 112    Family/Social History: Pt is  to Abdirizak Mercado. They have been  for 56 years. Through the marriage they had one child Matt Calix. Matt Calix is  to [facility] and she is pt's HCPOA. Pt was a  and was well known as a good . Spouse states she thinkls they will be using [facility] in [place] but not sure. Spiritual Assessment: Pt is a member of [employer] and has very good support. Jaelyn Guthrie was here visiting this morning. Living Arrangements for the Past Year: Pt was living at home with his spouse. Patient's Cognitive  And Emotional Status Pt was obtunded when I was in the room. .      Advance Directives and DNR Status, Pt has a HCPOA on file in the chart on the floor. Pt is a DNR    Risk of Harm Pt is not a risk to himself or anyone else     Bereavement Risk Assessment, Spouse has mild dementia and she gets turned around easily. She has good [employer] support. She has one son who is supportive of her. Low risk      Plan for patient. No plans for discharge at this time.   Pt is close to an active state of dying

## 2017-11-16 NOTE — H&P
History and Physical    Patient: Bubba De La Cruz MRN: [id number]  SSN: [id number]    YOB: 1940  Age: 68 y.o. Sex: male      Subjective:      Bubba De La Cruz is a 68 y.o. male who has CLL and a white count of 186,000 and is currently under therapy. Other medical problems as outlined. John Nj recently had what sounds like a bilateral inguinal herniorrhaphy with return to the emergency room with bilateral leg cellulitis complicated by  Right lower lobe pneumonia.  All criteria for SIRS with lactic acidemia fever and pathologic leukocytosis.  Serum creatinine of 1.7. BNP is greater than 1000. Thoracic and abdominal CT scans reveal the development of new thoracic and abdominal adenopathy compatible with lymphomatous transformation. Despite aggressive medical intervention the patient has not responded to therapy.  It was not felt that further chemotherapy or other aggressive medical interventions would be of any benefit.  The patient desires comfort measures at this time.  Patient is requiring intravenous doses of morphine and Haldol for comfort.  The patient is a DNR. Past Medical History:   Diagnosis Date    Acquired thrombocytopenia ([facility] [address]     part of his CLL    CAD (coronary artery disease)     CABG x 4 in 4/2008-- [facility] dr ch[facility] cardio    CLL (chronic lymphocytic leukemia) ([facility] [address]     Dr. Ana Mayfield manages--per wife-- igg monthly    Coagulation defects     bruises easy d/t Xarelto/asa    Depression     managed with medication     Enlarged prostate     has indwelling catheter    GERD (gastroesophageal reflux disease)     managed with medication     Hepatitis B     managed with medication     History of kidney stones     with surgical intervention    Hypokalemia     managed with supplement.      Hypomagnesemia     managed with supplement    Hypothyroidism     managed with medication     PAF (paroxysmal atrial fibrillation) (HCC)     PUD (peptic ulcer disease) 2009    no recent symptoms    Retinal detachment     right eye    RLL pneumonia ([address] 5/2013    Thromboembolus ([facility] [address] 2013    left leg dx'ed 5/13 on asa and xarelto. RLE DVT 7/2016, cannot take Aspirin due to Leukemia     Past Surgical History:   Procedure Laterality Date    CABG, ARTERY-VEIN, FOUR  4/2008    CABG x 4    HX HEENT  2/18/2016    siinus    HX HEENT  11/2016 at [facility]    vitrectomy---- plans to have another surg at [facility] eye 2/9/17 per wife    HX HERNIA REPAIR  2010    Left  inguinal hernia repair    HX LAP CHOLECYSTECTOMY  9/12    HX ORTHOPAEDIC  1980's    Right foot muscle repair    HX ORTHOPAEDIC  1960's    Right hand surgery    HX TURP      HX UROLOGICAL      lithotripsy & cystoscopy with stent due to kidney stones      Family History   Problem Relation Age of Onset    Heart Disease Mother     Heart Surgery Mother     Dementia Mother     Heart Disease Father     Heart Attack Father     Heart Disease Brother     Heart Surgery Brother     Heart Attack Brother     No Known Problems Maternal Grandmother     No Known Problems Maternal Grandfather     No Known Problems Paternal Grandmother     No Known Problems Paternal Grandfather      Social History   Substance Use Topics    Smoking status: Never Smoker    Smokeless tobacco: Never Used    Alcohol use No      Prior to Admission medications    Medication Sig Start Date End Date Taking? Authorizing Provider   levothyroxine (SYNTHROID) 100 mcg tablet Take 1 Tab by mouth Daily (before breakfast). 11/2/17  Yes Augustine Cuello PA-C   amoxicillin (AMOXIL) 875 mg tablet Take 1 Tab by mouth two (2) times a day. 10/9/17  Yes Teresa Gonzalez MD   diphenhydrAMINE (BENADRYL) 25 mg capsule Take 25 mg by mouth. Yes Historical Provider   nitroglycerin (NITROSTAT) 0.4 mg SL tablet 1 Tab by SubLINGual route every five (5) minutes as needed. 8/28/17  Yes Leonel Calloway MD   furosemide (LASIX) 20 mg tablet Take 1 Tab by mouth daily.  Indications: Edema 8/25/17  Yes Gal Robles MD   pravastatin (PRAVACHOL) 80 mg tablet Take 1 Tab by mouth nightly. 7/28/17  Yes Rachel Mejia PA-C   temazepam (RESTORIL) 15 mg capsule  ONE  PO NIGHTLY PRN FOR SLEEP 5/16/17  Yes Rachel Mejia PA-C   fludrocortisone (FLORINEF) 0.1 mg tablet Take 0.1 mg by mouth. 3/2/17  Yes Historical Provider   magnesium oxide (MAG-OX) 400 mg tablet Take 400 mg by mouth two (2) times a day. Yes Historical Provider   omeprazole (PRILOSEC) 40 mg capsule Take 40 mg by mouth every morning. Yes Historical Provider   fluticasone (FLONASE) 50 mcg/actuation nasal spray INSTILL 2 SPRAYS IN EACH NOSTRIL BID 8/16/16  Yes Historical Provider        Allergies   Allergen Reactions    Iohexol Itching and Shortness of Breath     Allergic to contrast dye    Iodinated Contrast- Oral And Iv Dye Nausea and Vomiting and Other (comments)     For 2 days and severe headache.  Protonix [Pantoprazole] Diarrhea    Red Dye Hives       Review of Systems: The remainder of the admission historical database is as outlined in the Clinical Record. Objective:     Vitals:    11/15/17 2038 11/16/17 0518   BP: (!) 87/46 90/52   Pulse: (!) 40 73   Resp: 14 16   Temp: 97.8 °F (36.6 °C) 97.6 °F (36.4 °C)        Physical Exam:    Vital signs are stable as outlined    Skin examination reveals senile changes with much diminished erythema in the proximal legs and nice healing of his hernia incisions    Head and neck examination reveals no adenopathy or jaundice and clear mucosa    Thoracic examination reveals a port in place on the right side of the chest with no other chest deformity and scattered rhonchi and rales throughout with no respiratory distress    Cardiovascular examination reveals an irregular apical pulse 282/J with no diastolic murmurs or rubs    Abdominal examination reveals no palpable masses or tenderness.     Extremities reveal mild osteophytic changes and no acutely inflamed unstable joints. Peripheral left examination reveals no edema or calf tenderness and diminished peripheral at 0 pulseless without ischemic changes. Neurologic examination reveals patient to be [id number] and no lateralizing abnormalities found. Assessment:     Hospital Problems  Date Reviewed: 10/9/2017          Codes Class Noted POA    * (Principal)Systemic inflammatory response syndrome ([facility] ICD-10-CM: R65.10  ICD-9-CM: 995.90  11/15/2017 Unknown        CLL (chronic lymphocytic leukemia) ([facility] (Chronic) ICD-10-CM: C91.10  ICD-9-CM: 204.10 Chronic 5/28/2013 Yes    Overview Signed 5/29/2013  7:36 AM by Juliana Scales     2-29-13  IGIV 200mg/kg Q 4 weeks started.              Non Hodgkin's lymphoma ([facility] ICD-10-CM: C85.90  ICD-9-CM: 202.80  11/15/2017 Unknown        SIRS (systemic inflammatory response syndrome) (HCC) ICD-10-CM: R65.10  ICD-9-CM: 995.90  11/15/2017 Unknown              Plan:     Current Facility-Administered Medications   Medication Dose Route Frequency    sodium chloride (NS) flush 3 mL  3 mL IntraVENous PRN    morphine (ROXANOL) concentrated oral syringe 10 mg  10 mg Oral Q30MIN PRN    Or    morphine (ROXANOL) concentrated oral syringe 10 mg  10 mg SubLINGual Q30MIN PRN    morphine injection 4 mg  4 mg SubCUTAneous Q20MIN PRN    Or    morphine injection 4 mg  4 mg IntraVENous Q20MIN PRN    acetaminophen (TYLENOL) tablet 650 mg  650 mg Oral Q4H PRN    acetaminophen (TYLENOL) suppository 650 mg  650 mg Rectal Q3H PRN    senna (SENOKOT) tablet 8.6 mg  1 Tab Oral BID    loperamide (IMODIUM) capsule 4 mg  4 mg Oral PRN    hyoscyamine SL (LEVSIN/SL) tablet 0.125 mg  0.125 mg SubLINGual Q4H PRN    glycopyrrolate (ROBINUL) injection 0.2 mg  0.2 mg SubCUTAneous Q4H PRN    LORazepam (ATIVAN) injection 2 mg  2 mg IntraVENous Q10MIN PRN    LORazepam (ATIVAN) injection 2 mg  2 mg IntraVENous Q2H PRN    Or    LORazepam (ATIVAN) injection 2 mg  2 mg IntraMUSCular Q2H PRN    haloperidol lactate (HALDOL) injection 2 mg  2 mg IntraVENous Q2H PRN    Or    haloperidol lactate (HALDOL) injection 2 mg  2 mg SubCUTAneous Q2H PRN       I have reviewed the situation with the nursing staff and the family. He is markedly compromised with multiorgan system failure. His leg cellulitis seems to be improving a little bit but I suspect that not very much else of this process is reversible. We will keep him comfortable and observed closely to better establish his clinical trajectory and likelihood of death.       Signed By: Gil Ibrahim MD     November 16, 2017

## 2017-11-16 NOTE — PROGRESS NOTES
Report received from off-going nurse, visual identification made, assumed care of pt. Pt attempting to get out of bed, assisted back to bed , no grimacing or groaning. Pt respirations unlabored. Tab alert in place, rails up x 2, bed in lowest position, safety maintained. FLACC 0.

## 2017-11-17 NOTE — PROGRESS NOTES
Progress Note    Patient: Kalia Alberts MRN: [id number]  SSN: [id number]    YOB: 1940  Age: 68 y.o. Sex: male      Admit Date: 11/15/2017    LOS: 2 days     Subjective:     Drowsy, confused and agitated. NPO this AM. Bites and sips yesterday. Mouth care only so far today. No family at bedside. Review of Systems:  Review of systems not obtained due to patient factors. Objective:     Vitals:    11/15/17 2038 11/16/17 0518 11/16/17 1536 11/17/17 0306   BP: (!) 87/46 90/52 92/54 97/54   Pulse: (!) 40 73 73 77   Resp: 14 16 16 16   Temp: 97.8 °F (36.6 °C) 97.6 °F (36.4 °C) 96.1 °F (35.6 °C)         Intake and Output:  Current Shift:    Last three shifts: 11/15 1901 - 11/17 0700  In: 2 [P.O.:2]  Out: [Urine:850]    Physical Exam:   GENERAL: fatigued, cooperative, mild distress, appears stated age  LUNG: Coarse breath sounds with rhonchi with unlabored respirations. HEART: irregularly irregular rhythm  ABDOMEN: soft, non-tender. Bowel sounds hypoactive. : Osborn catheter with 850ml urine output in the past 24 hours. EXTREMITIES:  extremities normal, atraumatic, no cyanosis or edema. + pulses. SKIN: Normal. Warm to touch. Right port accessed for medication administration. NEUROLOGIC: Drowsy, confused. Unable to participate in exam.   PSYCHIATRIC: agitated    Lab/Data Review:  No new labs resulted in the last 24 hours. Assessment:     Principal Problem:    Systemic inflammatory response syndrome ([facility] [address] (11/15/2017)    Active Problems:    CLL (chronic lymphocytic leukemia) ([facility] [address] (5/28/2013)      Overview: 2-29-13  IGIV 200mg/kg Q 4 weeks started.       Non Hodgkin's lymphoma ([facility] [address] (11/15/2017)      SIRS (systemic inflammatory response syndrome) ([facility] [address] (11/15/2017)        Plan:     Current Facility-Administered Medications   Medication Dose Route Frequency    sodium chloride (NS) flush 5-10 mL  5-10 mL IntraVENous Q8H    sodium chloride (NS) flush 5-10 mL  5-10 mL IntraVENous PRN    heparin (porcine) pf 300 Units  300 Units InterCATHeter PRN    sodium chloride (NS) flush 3 mL  3 mL IntraVENous PRN    morphine (ROXANOL) concentrated oral syringe 10 mg  10 mg Oral Q30MIN PRN    Or    morphine (ROXANOL) concentrated oral syringe 10 mg  10 mg SubLINGual Q30MIN PRN    morphine injection 4 mg  4 mg SubCUTAneous Q20MIN PRN    Or    morphine injection 4 mg  4 mg IntraVENous Q20MIN PRN    acetaminophen (TYLENOL) tablet 650 mg  650 mg Oral Q4H PRN    acetaminophen (TYLENOL) suppository 650 mg  650 mg Rectal Q3H PRN    senna (SENOKOT) tablet 8.6 mg  1 Tab Oral BID    loperamide (IMODIUM) capsule 4 mg  4 mg Oral PRN    hyoscyamine SL (LEVSIN/SL) tablet 0.125 mg  0.125 mg SubLINGual Q4H PRN    glycopyrrolate (ROBINUL) injection 0.2 mg  0.2 mg SubCUTAneous Q4H PRN    LORazepam (ATIVAN) injection 2 mg  2 mg IntraVENous Q10MIN PRN    LORazepam (ATIVAN) injection 2 mg  2 mg IntraVENous Q2H PRN    Or    LORazepam (ATIVAN) injection 2 mg  2 mg IntraMUSCular Q2H PRN    haloperidol lactate (HALDOL) injection 2 mg  2 mg IntraVENous Q2H PRN    Or    haloperidol lactate (HALDOL) injection 2 mg  2 mg SubCUTAneous Q2H PRN     Admitted GIP with SIRS for management of pain, dyspnea and agitation. 1. Pain: Morphine as ordered. 2. Dyspnea: Morphine as ordered. Glycopyrrolate prn secretions. Oxygen prn.    3. Agitation: Haloperidol and Lorazepam as ordered. 4. Family/Pt Support: No family at bedside during exam. Medications and plan of care discussed with nursing staff. Will continue to monitor for symptoms and adjust medications as needed to maintain patient comfort. PPS 10%. Case discussed with Dr. Kameron Donahue and [facility]. No changes in plan of care.      Signed By: Amaya Bright NP     November 17, 2017

## 2017-11-17 NOTE — HSPC IDG NURSE NOTES
Patient: Edin Brandt    Date: 11/17/17  Time: 9:54 AM    [facility] Nurse Notes    UPDATE: Patient is a 68year old Male patient with diagnosis of Lymphoma and GIP level of care for Pain and Agitation management. NPO this am per report, due to lethargy. GOALS OF CARE:   Continue to monitor for optimal patient comfort and symptom management. Ongoing family support with patients wife.             Signed by: June Eaton

## 2017-11-17 NOTE — HSPC IDG VOLUNTEER NOTES
[address] Review     Status Codes I = Initiated C=Continued R=Revised RS = Resolved     I.  Volunteer     Goal: Hospice house volunteer (s) enhances the quality of remaining life while patient is at the hospice house. Interventions: Dion Case Volunteer (s) will provide companionship to the patient and/or family by visiting at the hospice house       . Dion Case Volunteer (s) will provide respite as needed when requested by patient and/or family. Dion Amezquita  Volunteer will provide activities such as music, reading, pet therapy, etc. as requested. Dion Amezquita  Comfort bag delivered. Any other special requests or information regarding volunteer services:    One visit is recorded for comfort bag delivery. Staff have asked for extra visits to the pt and volunteers have been notified. No further needs identified at this time. These notes have been discussed in [facility] meeting.         Signed by: Ntio Gatica

## 2017-11-17 NOTE — PROGRESS NOTES
Situation: Kadeem Mcdaniels is a 68 y.o. male who has CLL and a white count of 186,000 and has been in therapy. His Hospice diagnoses is SIRS and Lymphoma. He is GIP level of care for treatment of pain. Patient has been under treatment for lymphoma for a number of years and managed quite well until recently. According to his wife he has had a rapid decline after hernia surgery despite efforts for improvement. Background:  Patient and his wife have been  62 years. They have one son, one grandson and two step granddaughters. Their daughter in law is patient's [address]. Patient is a retired . He and his wife are members of [employer]. Their  is actively involved in their spiritual care. According to staff patient's wife has some mild dementia. Assessment: During 's visit patient was quiet in the beginning. When  entered the room his wife was standing at bedside with her coat on.  introduced herself and wife began to tall their story. She says her  has been dealing with lymphoma for a number of years but in her opinion after a recent surgery he has not bounced back and unfortunately had a steep decline. The couple's son is supportive but does need to work as much as possible. Their daughter in law helps with health care decisions. Today patient's niece is picking her up at 2 PM.   affirmed wife's emotional struggle and her 's unfortunate condition.  affirmed their strength and love for each other. Wife mentioned she has a relative who is also sick.  has a hunch she might have been talking about her sister but not completely sure. After a sort time Remonia Candy NP came in to examine the patient.  and wife stepped just outside the door. We could hear him sounding a bit agitated as she explained him.   A stretcher came down the hallway to  a body so  directed wife to go back in the room and  closed the door.  then asked if wife had any questions for Jack Sánchez NP. They talked briefly and wife asked Jack Sánchez to call her daughter in law with a report. After Jack Sánchez left  and wife went to the bedside and  offered a prayer.  then walked with wife and stayed with her until her ride arrived. Recommendation For Plan of Care:   to continue spiritual support for patient and family through meaningful conversation, compassion, scripture and prayer during patient's time at [facility]. Bereavement:  Low risk but taking into consideration the need for re evaluation after patient's death especially where wife may have the beginning stages of dementia. Bereavement Risk Scale - Patient    Normal or Uncomplicated Grief  ( Low)  x. Good support ( family/friends/ community of phyllis)  . Open expression of Emotions  . Evidence of good coping skills/ History of coping well with loss  . Appropriately tearful (taking cultural background into account)    Grief Normal But Severe ( Moderate)  . Low or no support  . Difficulty Expressing emotions  . History of Difficulty with Loss  . Depression/other mental health challenges  . Unresolved conflict with / with other family members  . Somatic Distress ( Physical symptoms such as pain and fatigue)  Grief with Complicating Factors ( High Risk)  . Severe Depression  . Excessive Guilt  . Multiple Losses  . Other Life Crises  . Suicidal/Homicidal Ideation ( Immediate referral required)  . Substance Abuse  . Excessive Anger  . Unresolved Losses  . History of Difficulty dealing with Loss    Bereavement Risk Scale - Wife    Normal or Uncomplicated Grief  ( Low)  x. Good support ( family/friends/ community of phyllis)  x. Open expression of Emotions  . Evidence of good coping skills/ History of coping well with loss  x. Appropriately tearful (taking cultural background into account)    Grief Normal But Severe ( Moderate)  . Low or no support  . Difficulty Expressing emotions  . History of Difficulty with Loss  x. Depression/other mental health challenges - Mild dementia reported by nurse  . Unresolved conflict with / with other family members  . Somatic Distress ( Physical symptoms such as pain and fatigue)  Grief with Complicating Factors ( High Risk)  . Severe Depression  . Excessive Guilt  . Multiple Losses  . Other Life Crises  . Suicidal/Homicidal Ideation ( Immediate referral required)  . Substance Abuse  . Excessive Anger  . Unresolved Losses  . History of Difficulty dealing with Loss

## 2017-11-17 NOTE — HSPC IDG CHAPLAIN NOTES
Patient: Isablele Baez    Date: 11/17/17  Time: 9:54 AM    [facility]  Notes  Assessment Pending for Spiritual Care. According to NP, patient is declining. Wife has dementia.          Signed by: Shasta Osorio

## 2017-11-17 NOTE — HSPC IDG SOCIAL WORKER NOTES
Patient: Chiquita Blackburn    Date: 11/17/17  Time: 9:54 AM    [facility]  Notes    LMSW will continue to provide emotional support. Spouse has some memory issues. She gets turned around in the facility.   Shannan Opitz will be [place] in [place]        Signed by: French Rivero

## 2017-11-17 NOTE — ROUTINE PROCESS
Pt I'd by name and . Pt calm. No distress. No facial grimace. Flacc =0-1. Resp shallow, irreg, non labored on 4L n/c. Lungs with rales bilaterally. BS diminished. HR irreg. No edema noted at this time. Osborn cath draining  patricio urine. SR up x2. Bed low/locked. Call light with in reach. Door opened. Tab alerts on.

## 2017-11-18 NOTE — PROGRESS NOTES
Report received from off-going nurse, pt identified by name/. Assumed care of pt. Pt resting quietly with eyes closed, no agitation or restlessness, no grimacing or groaning. Pt respirations unlabored. Tab alert in place, rails up x 2, bed in lowest position, safety maintained. FLACC 0.

## 2017-11-18 NOTE — PROGRESS NOTES
Pt summary: pt was lethargic most of the shift. He was medicated x 1 with oral morphine and appeared to receive relief. Pt has a dry non productive cough. He only had sips of water and received mouth care. Pt's wife was at bedside most of the day but she would become confused at times having difficulty finding her way back to his room. Checked every hour throughout the day, safety maintained.

## 2017-11-19 NOTE — PROGRESS NOTES
Progress Note    Patient: Minda Beal MRN: [id number]  SSN: [id number]    YOB: 1940  Age: 68 y.o. Sex: male      Admit Date: 11/15/2017    LOS: 3 days     Subjective:   I saw Mister couch on afternoon rounds. His wife was at bedside. Nursing staff reports, and the family confirms that the patient's limited interval of responsiveness and effective intake of oral fluids yesterday has now faded into obtundation. He had required several doses of Haldol for agitation in the early morning hours today. Review of Systems:  Patient was unresponsive to voice and light touch. Objective:     Vitals:    11/17/17 0306 11/17/17 1511 11/18/17 0335 11/18/17 1540   BP: 97/54 95/54 91/51 100/58   Pulse: 77 78 71 87   Resp: 16 20 18 15   Temp:  96.1 °F (35.6 °C) 95.6 °F (35.3 °C) 97.1 °F (36.2 °C)        Intake and Output:  Current Shift:    Last three shifts: 11/17 0701 - 11/18 1900  In: 0   Out: 1400 [Urine:1400]    Physical Exam:   the patient is nonresponsive to light touch and voice. He continues to have adequate output of somewhat concentrated urine. His ears and hands appear somewhat cyanotic. Lab/Data Review:  No new labs resulted in the last 24 hours. I briefly reviewed the patient's history of developing rapidly progressive lymphoma after approximately 5 years of indolent CLL on oral therapy and infusion immunotherapy. Dr. Tea Godinez, the patient's hematologist, though that his functional status was far too low to allow the patient to second excess spottily tolerate therapy for lymphoma in light of recurrent community-acquired infection. Assessment:     Principal Problem:    Systemic inflammatory response syndrome ([facility] [address] (11/15/2017)    Active Problems:    CLL (chronic lymphocytic leukemia) ([facility] [address] (5/28/2013)      Overview: 2-29-13  IGIV 200mg/kg Q 4 weeks started.       Non Hodgkin's lymphoma ([facility] [address] (11/15/2017)      SIRS (systemic inflammatory response syndrome) ([facility] [address] (11/15/2017)        Plan:     Current Facility-Administered Medications   Medication Dose Route Frequency    sodium chloride (NS) flush 10 mL  10 mL InterCATHeter Q12H    heparin (porcine) pf 300 Units  300 Units InterCATHeter Q12H    sodium chloride (NS) flush 10 mL  10 mL InterCATHeter PRN    heparin (porcine) pf 300 Units  300 Units InterCATHeter PRN    bisacodyl (DULCOLAX) suppository 10 mg  10 mg Rectal PRN    glycopyrrolate (ROBINUL) injection 0.2 mg  0.2 mg IntraVENous Q4H PRN    morphine (ROXANOL) concentrated oral syringe 10 mg  10 mg Oral Q30MIN PRN    Or    morphine (ROXANOL) concentrated oral syringe 10 mg  10 mg SubLINGual Q30MIN PRN    morphine injection 4 mg  4 mg SubCUTAneous Q20MIN PRN    Or    morphine injection 4 mg  4 mg IntraVENous Q20MIN PRN    acetaminophen (TYLENOL) suppository 650 mg  650 mg Rectal Q3H PRN    LORazepam (ATIVAN) injection 2 mg  2 mg IntraVENous Q10MIN PRN    LORazepam (ATIVAN) injection 2 mg  2 mg IntraVENous Q2H PRN    Or    LORazepam (ATIVAN) injection 2 mg  2 mg IntraMUSCular Q2H PRN    haloperidol lactate (HALDOL) injection 2 mg  2 mg IntraVENous Q2H PRN    Or    haloperidol lactate (HALDOL) injection 2 mg  2 mg SubCUTAneous Q2H PRN       I provided emotional support and the chance for the patient's wife to review her 's social and medical history as well as a chance to reflect on the 60 year duration of their relationship beginning with courtship  When she was 13 and marriage at age 25.    I reviewed the comfort measures as they are being employed and touched upon prognosis as 5 or fewer days of expected survival.    Signed By: Milly Rogers MD     November 18, 2017

## 2017-11-19 NOTE — PROGRESS NOTES
Pt without v/s or response to any stimuli  per observation, auscultation and palpation. Notified López Espitia via phone. Emotional support provided. Family will be en route to facility. Postmortem care provided.

## 2017-11-20 NOTE — [facility] IDG MASTER NOTE
Hospice Interdisciplinary Group Collaborative  Date: 11/17/2017  Time: 09:30 am    ___________________    Patient: Bebe Barber ID: [unfilled]  MRN: [id number]      ___________________    Diagnoses:  Diagnoses of Systemic inflammatory response syndrome ([facility] [address] C[facility] lymphocytic [facility]) [facility] and Small B-cell lymphoma, unspecified body region [facility]) were pertinent to this visit. Current Medications:  No current facility-administered medications for this encounter. Current Outpatient Prescriptions:     levothyroxine (SYNTHROID) 100 mcg tablet, Take 1 Tab by mouth Daily (before breakfast). , Disp: 90 Tab, Rfl: 1    amoxicillin (AMOXIL) 875 mg tablet, Take 1 Tab by mouth two (2) times a day., Disp: 20 Tab, Rfl: 0    diphenhydrAMINE (BENADRYL) 25 mg capsule, Take 25 mg by mouth., Disp: , Rfl:     nitroglycerin (NITROSTAT) 0.4 mg SL tablet, 1 Tab by SubLINGual route every five (5) minutes as needed. , Disp: 25 Tab, Rfl: 5    furosemide (LASIX) 20 mg tablet, Take 1 Tab by mouth daily. Indications: Edema, Disp: 20 Tab, Rfl: 1    pravastatin (PRAVACHOL) 80 mg tablet, Take 1 Tab by mouth nightly., Disp: 30 Tab, Rfl: 5    temazepam (RESTORIL) 15 mg capsule,  ONE  PO NIGHTLY PRN FOR SLEEP, Disp: 30 Cap, Rfl: 5    fludrocortisone (FLORINEF) 0.1 mg tablet, Take 0.1 mg by mouth., Disp: , Rfl:     magnesium oxide (MAG-OX) 400 mg tablet, Take 400 mg by mouth two (2) times a day., Disp: , Rfl:     omeprazole (PRILOSEC) 40 mg capsule, Take 40 mg by mouth every morning., Disp: , Rfl:     fluticasone (FLONASE) 50 mcg/actuation nasal spray, INSTILL 2 SPRAYS IN EACH NOSTRIL BID, Disp: , Rfl: 2    Orders:  Orders Placed This Encounter    IP CONSULT TO SPIRITUAL CARE     Standing Status:   Standing     Number of Occurrences:   1     Order Specific Question:   Reason for Consult: Answer: Once on week one, then PRN. For [facility] Patients Only.  For contracted patients, their primary hospice will continue to manage spiritual care needs.  DISCONTD: sodium chloride (NS) flush 3 mL    DISCONTD: morphine (ROXANOL) concentrated oral syringe 10 mg    DISCONTD: morphine (ROXANOL) concentrated oral syringe 10 mg    DISCONTD: morphine injection 4 mg    DISCONTD: morphine injection 4 mg    DISCONTD: acetaminophen (TYLENOL) tablet 650 mg    DISCONTD: acetaminophen (TYLENOL) suppository 650 mg    DISCONTD: senna (SENOKOT) tablet 8.6 mg    DISCONTD: loperamide (IMODIUM) capsule 4 mg    DISCONTD: hyoscyamine SL (LEVSIN/SL) tablet 0.125 mg    DISCONTD: glycopyrrolate (ROBINUL) injection 0.2 mg    DISCONTD: LORazepam (ATIVAN) injection 2 mg    DISCONTD: LORazepam (ATIVAN) injection 2 mg    DISCONTD: LORazepam (ATIVAN) injection 2 mg    DISCONTD: haloperidol lactate (HALDOL) injection 2 mg    DISCONTD: haloperidol lactate (HALDOL) injection 2 mg    DISCONTD: sodium chloride (NS) flush 5-10 mL    DISCONTD: sodium chloride (NS) flush 5-10 mL    DISCONTD: heparin (porcine) pf 300 Units    DISCONTD: sodium chloride (NS) flush 10 mL    DISCONTD: heparin (porcine) pf 300 Units    DISCONTD: sodium chloride (NS) flush 10 mL    DISCONTD: heparin (porcine) pf 300 Units    DISCONTD: bisacodyl (DULCOLAX) suppository 10 mg    DISCONTD: glycopyrrolate (ROBINUL) injection 0.2 mg    INITIAL PHYSICIAN ORDER: HOSPICE Level Of Care: General; Reason for Admission: SIRS for pain and leg wounds control     Standing Status:   Standing     Number of Occurrences:   1     Order Specific Question:   Status     Answer:   Hospice     Order Specific Question:   Level Of Care     Answer:   General     Order Specific Question:   Reason for Admission     Answer:   SIRS for pain and leg wounds control     Order Specific Question:   Inpatient Hospitalization Certified Necessary for the Following Reasons     Answer:   3.  Patient receiving treatment that can only be provided in an inpatient setting (further clarification in H&P documentation)     Order Specific Question:   Admitting Diagnosis     Answer:   SIRS (systemic inflammatory response syndrome) ([facility] [address] [[id number]]     Order Specific Question:   Terminal Prognosis Diagnosis(es)     Answer:   SIRS (systemic inflammatory response syndrome) [facility]) [[id number]]     Order Specific Question:   Admitting Physician     Answer:   Natan Ornelas     Order Specific Question:   Attending Physician     Answer:   Natan Ornelas       Allergies: Allergies   Allergen Reactions    Iohexol Itching and Shortness of Breath     Allergic to contrast dye    Iodinated Contrast- Oral And Iv Dye Nausea and Vomiting and Other (comments)     For 2 days and severe headache.  Protonix [Pantoprazole] Diarrhea    Red Dye Hives       Care Plan:       Multidisciplinary Problems (Active)           Problem: Anticipatory Grief     Dates: Start: 11/17/17       Disciplines: Interdisciplinary    Goal: Grief heard and acknowledged, anxiety reduced, patient coping identified, patient/family expressed gratitude     Dates: Start: 11/17/17      Disciplines: Interdisciplinary   Intervention: Assess grief responses    Dates: Start: 11/17/17       Description: Assess for feelings of grief    Intervention: Support grieving process    Dates: Start: 11/17/17       Description: Address feelings of loss, anticipatory grief, expression of concern.           Problem: Emotional Support Needs     Dates: Start: 11/16/17       Disciplines: Interdisciplinary    Goal: Patient/family is receiving emotional support     Dates: Start: 11/16/17      Disciplines: Interdisciplinary   Intervention: Assess for emotional distress    Dates: Start: 11/16/17      Intervention: Provide emotional support    Dates: Start: 11/16/17      Intervention: Provide emotional support of the family's cultural expressions of grief and loss    Dates: Start: 11/16/17       Description: Provide emotional support of the family's cultural expression of grief, loss, and response to illness. Intervention: Refer to bereavement    Dates: Start: 11/16/17      Intervention: Refer to  services    Dates: Start: 11/16/17            Problem: Falls - Risk of     Dates: Start: 11/16/17       Disciplines: Interdisciplinary    Goal: *Absence of Falls     Dates: Start: 11/16/17      Description: Document Milton Conklin Fall Risk and appropriate interventions in the flowsheet.     Disciplines: Interdisciplinary         Problem: Family Significant Other Needs     Dates: Start: 11/16/17       Disciplines: Interdisciplinary    Goal: Patient/family will receive support from community resources     Dates: Start: 11/16/17      Disciplines: Interdisciplinary   Intervention: Assess family/caregiver needs    Dates: Start: 11/16/17      Intervention: Instruct family/caregiver on hospice and ancillary services    Dates: Start: 11/16/17      Intervention: MSW community resource planning    Dates: Start: 11/16/17            Problem: Patient Education: Go to Patient Education Activity     Dates: Start: 11/16/17       Disciplines: Interdisciplinary    Goal: Patient/Family Education     Dates: Start: 11/16/17      Disciplines: Interdisciplinary         Problem: Patient Education: Go to Patient Education Activity     Dates: Start: 11/16/17       Disciplines: Interdisciplinary    Goal: Patient/Family Education     Dates: Start: 11/16/17      Disciplines: Interdisciplinary         Problem: Pressure Injury - Risk of     Dates: Start: 11/16/17       Disciplines: Interdisciplinary    Goal: *Prevention of pressure ulcer     Dates: Start: 11/16/17      Disciplines: Interdisciplinary   Intervention: Pressure injury risk assessment tool    Dates: Start: 11/16/17       Description: (e.g.: Yuri Scale)    Intervention: Pressure-relieving device needs assessment    Dates: Start: 11/16/17      Intervention: Pressure-relieving device implementation (eg: Specialty beds; wheel chair cushions; heel lift boots) Dates: Start: 11/16/17      Intervention: Skin assessment (e.g. existing ulcers, color; integrity; moisture; horace prominences; blisters; friction/shear injuries)    Dates: Start: 11/16/17      Intervention: Skin monitoring and care (e.g. skin cleansing; keep dry, moisturize, utilization of  lotion and/or skin barrier cream)    Dates: Start: 11/16/17      Intervention: Blood glucose control (eg: Monitoring; medication; nutrition/hydration)    Dates: Start: 11/16/17      Intervention: Position change (eg: Techniques to position; turn and transfer per schedule; cushion horace prominences; float heels; encourage mobility)    Dates: Start: 11/16/17      Intervention: Nutrition promotion (eg: Micro/macro nutrient supplementation; encourage oral intake; blood glucose control; nutrition support when indicated)    Dates: Start: 11/16/17            Problem: Spiritual Evaluation     Dates: Start: 11/17/17       Disciplines: Interdisciplinary    Goal: Identify beliefs/practices that support hospice experience     Dates: Start: 11/17/17      Description: Patient/family identify their beliefs/practices that impair Hospice experience. Patient/family identify their beliefs/practices that support Hospice experience. Patient coping identified. Spiritual distress identified and decreased with visit. Disciplines: Interdisciplinary   Intervention: Assess spiritual needs    Dates: Start: 11/17/17       Description: Assist with spiritual questions    Intervention: Assist with clergy contact    Dates: Start: 11/17/17      Intervention: Assist with spiritual resources    Dates: Start: 11/17/17      Intervention: Offer ongoing support/referrals to community resources    Dates: Start: 11/17/17      Intervention: Provide spiritual support    Dates: Start: 11/17/17       Description: Assist with coordination of spiritual needs. Needs may include communion, anointing, / visits, and fear. ___________________    Care Team Notes          POC/IDG Notes      [facility] IDG Volunteer Notes by Day Ellis at 11/17/17 1536  Version 1 of 1    Author:  Day Ellis Service:  Juan Greene Author Type:  Hospice Volunteer/    Filed:  11/17/17 1537 Date of Service:  11/17/17 1536 Status:  Signed    :  Day Ellis (Hospice Volunteer/)               [facility] Interdisciplinary Plan of Care Review     Status Codes I = Initiated C=Continued R=Revised RS = Resolved     I.  Volunteer     Goal: Hospice house volunteer (s) enhances the quality of remaining life while patient is at the hospice house. Interventions: Garfield Torres Luangélicaer Volunteer (s) will provide companionship to the patient and/or family by visiting at the hospice house       . Garfield Brodericker Volunteer (s) will provide respite as needed when requested by patient and/or family. Garfield Guerrero  Volunteer will provide activities such as music, reading, pet therapy, etc. as requested. Garfield Whalenes  Comfort bag delivered. Any other special requests or information regarding volunteer services:    One visit is recorded for comfort bag delivery. Staff have asked for extra visits to the pt and volunteers have been notified. No further needs identified at this time. These notes have been discussed in [facility] meeting. Signed by: Rangel HAIR  Notes by Romel Garza at 11/17/17 2038  Version 1 of 1    Author:  Romel Garza Service:  Spiritual Care Author Type:  Pastoral Care    Filed:  11/17/17 0956 Date of Service:  11/17/17 0954 Status:  Signed    :  Romel Garza (Pastoral Care)           Patient: Dave Du    Date: 11/17/17  Time: 9:54 AM    [facility]  Notes  Assessment Pending for Spiritual Care. According to NP, patient is declining. Wife has dementia.          Signed by: Romel Garza       [facility] IDG  Notes by Juliet Webber Anuradha at 11/17/17 0954  Version 1 of 1    Author:  Ludy Andre Service:  Licensed Clinical  Author Type:      Filed:  11/17/17 0956 Date of Service:  11/17/17 0954 Status:  Signed    :  Ludy Andre ()           Patient: Tyler Sinha    Date: 11/17/17  Time: 9:54 AM    [facility]  Notes    LMSW will continue to provide emotional support. Spouse has some memory issues. She gets turned around in the facility. Wm Prieto will be [facility] in [facility]        Signed by: Ludy Andre       [facility] IDG Nurse Notes by Sid Kilgore at 11/17/17 1100  Version 1 of 1    Author:  Sid Kilgore Service:  Fer Segura Author Type:  Registered Nurse    Filed:  11/17/17 0956 Date of Service:  11/17/17 0954 Status:  Signed    :  Sid Kilgore (Registered Nurse)           Patient: Tyler Sinha    Date: 11/17/17  Time: 9:54 AM    [facility] Nurse Notes    UPDATE: Patient is a 68year old Male patient with diagnosis of Lymphoma and GIP level of care for Pain and Agitation management. NPO this am per report, due to lethargy. GOALS OF CARE:   Continue to monitor for optimal patient comfort and symptom management. Ongoing family support with patients wife.             Signed by: Sid Kilgore

## 2017-12-07 NOTE — [facility] IDG BEREAVEMENT NOTES
Patient death and family bereavement needs discussed by IDG. Bereavement risk assessed as LOW . Bereavement support to be provided as appropriate.

## 2021-01-01 NOTE — PROGRESS NOTES
Pt restless, wanting to get out of bed and sit in chair. Pt too weak. Turned and repositioned pt on his right side. LMP

## (undated) DEVICE — TRAY PREP DRY W/ PREM GLV 2 APPL 6 SPNG 2 UNDPD 1 OVERWRAP

## (undated) DEVICE — KENDALL SCD EXPRESS SLEEVES, KNEE LENGTH, MEDIUM: Brand: KENDALL SCD

## (undated) DEVICE — SYR IRR CATH TIP LR ADPT 70ML -- CONVERT TO ITEM 363120

## (undated) DEVICE — DEVICE STBL AD TRICOT ANCHR PD FOR 3 W F CATH STATLOK

## (undated) DEVICE — CATHETER URETH 24FR BLLN 30CC STD LTX 3 W TWO OPP DRNGE EYE

## (undated) DEVICE — BAG DRNGE 4000ML CONT IRRIG ROUNDED TEARDROP SHP DISP

## (undated) DEVICE — ELECTRD CUT LP ANG 27FR BRN

## (undated) DEVICE — CONTAINER SPEC FRMLN 120ML --

## (undated) DEVICE — SOLUTION IRRIG 1000ML H2O STRL BLT

## (undated) DEVICE — SOL IRR GLYC 1.5 % 3000ML --

## (undated) DEVICE — Y-TYPE TUR/BLADDER IRRIGATION SET, REGULATING CLAMP

## (undated) DEVICE — PACK PROCEDURE SURG TRANSURETHRAL RESECT OF PROST CDS

## (undated) DEVICE — REM POLYHESIVE ADULT PATIENT RETURN ELECTRODE: Brand: VALLEYLAB

## (undated) DEVICE — GOWN,REINFORCED,POLY,AURORA,XXLARGE,STR: Brand: MEDLINE